# Patient Record
Sex: FEMALE | Race: WHITE | NOT HISPANIC OR LATINO | Employment: OTHER | URBAN - METROPOLITAN AREA
[De-identification: names, ages, dates, MRNs, and addresses within clinical notes are randomized per-mention and may not be internally consistent; named-entity substitution may affect disease eponyms.]

---

## 2017-01-04 ENCOUNTER — PATIENT OUTREACH (OUTPATIENT)
Dept: ADMINISTRATIVE | Facility: HOSPITAL | Age: 72
End: 2017-01-04

## 2017-01-04 NOTE — PROGRESS NOTES
A letter was mailed to the patient on today in regards to the information below.      The patient is due for immunizations(pneumonia, tetanus, & flu) and diabetic eye and foot exam.

## 2017-01-25 ENCOUNTER — LAB VISIT (OUTPATIENT)
Dept: LAB | Facility: HOSPITAL | Age: 72
End: 2017-01-25
Attending: FAMILY MEDICINE
Payer: MEDICARE

## 2017-01-25 ENCOUNTER — OFFICE VISIT (OUTPATIENT)
Dept: FAMILY MEDICINE | Facility: CLINIC | Age: 72
End: 2017-01-25
Payer: MEDICARE

## 2017-01-25 DIAGNOSIS — E78.5 HYPERLIPIDEMIA WITH TARGET LDL LESS THAN 100: ICD-10-CM

## 2017-01-25 DIAGNOSIS — E11.319 CONTROLLED TYPE 2 DIABETES MELLITUS WITH RETINOPATHY WITHOUT MACULAR EDEMA, WITHOUT LONG-TERM CURRENT USE OF INSULIN, UNSPECIFIED LATERALITY, UNSPECIFIED RETINOPATHY SEVERITY: ICD-10-CM

## 2017-01-25 DIAGNOSIS — I10 ESSENTIAL HYPERTENSION: ICD-10-CM

## 2017-01-25 DIAGNOSIS — E11.9 TYPE 2 DIABETES MELLITUS WITHOUT COMPLICATION: ICD-10-CM

## 2017-01-25 DIAGNOSIS — Z00.00 ROUTINE MEDICAL EXAM: Primary | ICD-10-CM

## 2017-01-25 DIAGNOSIS — Z23 NEED FOR VACCINATION FOR PNEUMOCOCCUS: ICD-10-CM

## 2017-01-25 DIAGNOSIS — I70.0 ATHEROSCLEROSIS OF AORTA: ICD-10-CM

## 2017-01-25 LAB
ALBUMIN SERPL BCP-MCNC: 4 G/DL
ALP SERPL-CCNC: 62 U/L
ALT SERPL W/O P-5'-P-CCNC: 15 U/L
ANION GAP SERPL CALC-SCNC: 6 MMOL/L
AST SERPL-CCNC: 17 U/L
BASOPHILS # BLD AUTO: 0.01 K/UL
BASOPHILS NFR BLD: 0.2 %
BILIRUB SERPL-MCNC: 0.7 MG/DL
BUN SERPL-MCNC: 15 MG/DL
CALCIUM SERPL-MCNC: 9.9 MG/DL
CHLORIDE SERPL-SCNC: 102 MMOL/L
CHOLEST/HDLC SERPL: 5.4 {RATIO}
CO2 SERPL-SCNC: 28 MMOL/L
CREAT SERPL-MCNC: 0.8 MG/DL
DIFFERENTIAL METHOD: NORMAL
EOSINOPHIL # BLD AUTO: 0.1 K/UL
EOSINOPHIL NFR BLD: 1.7 %
ERYTHROCYTE [DISTWIDTH] IN BLOOD BY AUTOMATED COUNT: 13.2 %
EST. GFR  (AFRICAN AMERICAN): >60 ML/MIN/1.73 M^2
EST. GFR  (NON AFRICAN AMERICAN): >60 ML/MIN/1.73 M^2
GLUCOSE SERPL-MCNC: 102 MG/DL
HCT VFR BLD AUTO: 38.9 %
HDL/CHOLESTEROL RATIO: 18.4 %
HDLC SERPL-MCNC: 359 MG/DL
HDLC SERPL-MCNC: 66 MG/DL
HGB BLD-MCNC: 13.2 G/DL
LDLC SERPL CALC-MCNC: 255.4 MG/DL
LYMPHOCYTES # BLD AUTO: 1.9 K/UL
LYMPHOCYTES NFR BLD: 29.2 %
MCH RBC QN AUTO: 29.4 PG
MCHC RBC AUTO-ENTMCNC: 33.9 %
MCV RBC AUTO: 87 FL
MONOCYTES # BLD AUTO: 0.4 K/UL
MONOCYTES NFR BLD: 5.9 %
NEUTROPHILS # BLD AUTO: 4.2 K/UL
NEUTROPHILS NFR BLD: 62.8 %
NONHDLC SERPL-MCNC: 293 MG/DL
PLATELET # BLD AUTO: 277 K/UL
PMV BLD AUTO: 9.2 FL
POTASSIUM SERPL-SCNC: 4.3 MMOL/L
PROT SERPL-MCNC: 7.8 G/DL
RBC # BLD AUTO: 4.49 M/UL
SODIUM SERPL-SCNC: 136 MMOL/L
TRIGL SERPL-MCNC: 188 MG/DL
WBC # BLD AUTO: 6.64 K/UL

## 2017-01-25 PROCEDURE — G0009 ADMIN PNEUMOCOCCAL VACCINE: HCPCS | Mod: S$GLB,,, | Performed by: INTERNAL MEDICINE

## 2017-01-25 PROCEDURE — 90670 PCV13 VACCINE IM: CPT | Mod: S$GLB,,, | Performed by: INTERNAL MEDICINE

## 2017-01-25 PROCEDURE — 99499 UNLISTED E&M SERVICE: CPT | Mod: S$GLB,,, | Performed by: INTERNAL MEDICINE

## 2017-01-25 PROCEDURE — 85025 COMPLETE CBC W/AUTO DIFF WBC: CPT

## 2017-01-25 PROCEDURE — 3079F DIAST BP 80-89 MM HG: CPT | Mod: S$GLB,,, | Performed by: INTERNAL MEDICINE

## 2017-01-25 PROCEDURE — 99397 PER PM REEVAL EST PAT 65+ YR: CPT | Mod: S$GLB,,, | Performed by: INTERNAL MEDICINE

## 2017-01-25 PROCEDURE — 36415 COLL VENOUS BLD VENIPUNCTURE: CPT | Mod: PO

## 2017-01-25 PROCEDURE — 3077F SYST BP >= 140 MM HG: CPT | Mod: S$GLB,,, | Performed by: INTERNAL MEDICINE

## 2017-01-25 PROCEDURE — 80061 LIPID PANEL: CPT

## 2017-01-25 PROCEDURE — 99999 PR PBB SHADOW E&M-EST. PATIENT-LVL III: CPT | Mod: PBBFAC,,, | Performed by: INTERNAL MEDICINE

## 2017-01-25 PROCEDURE — 83036 HEMOGLOBIN GLYCOSYLATED A1C: CPT

## 2017-01-25 PROCEDURE — 99499 UNLISTED E&M SERVICE: CPT | Mod: ,,, | Performed by: INTERNAL MEDICINE

## 2017-01-25 PROCEDURE — 80053 COMPREHEN METABOLIC PANEL: CPT

## 2017-01-25 RX ORDER — LOSARTAN POTASSIUM AND HYDROCHLOROTHIAZIDE 12.5; 1 MG/1; MG/1
1 TABLET ORAL DAILY
Qty: 90 TABLET | Refills: 1 | Status: SHIPPED | OUTPATIENT
Start: 2017-01-25 | End: 2020-09-29

## 2017-01-25 NOTE — MR AVS SNAPSHOT
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       These Medications        Disp Refills Start End    losartan-hydrochlorothiazide 100-12.5 mg (HYZAAR) 100-12.5 mg Tab 90 tablet 1 2017     Take 1 tablet by mouth once daily. - Oral    Pharmacy: [facility]/pharmacy #[id number] - [place], [place] - [address] Ph #: [phone]         [facility]     [facility] On Call Nurse Care Line -  Assistance  Registered nurses in the [facility] Call Center provide clinical advisement, health education, appointment booking, and other advisory services.  Call for this free service at [phone].             Medications           Message regarding Medications     Verify the changes and/or additions to your medication regime listed below are the same as discussed with your clinician today.  If any of these changes or additions are incorrect, please notify your healthcare provider.             Verify that the below list of medications is an accurate representation of the medications you  "are currently taking.  If none reported, the list may be blank. If incorrect, please contact your healthcare provider. Carry this list with you in case of emergency.           Current Medications     aspirin (ECOTRIN) 81 MG EC tablet Take 81 mg by mouth once daily.      losartan-hydrochlorothiazide 100-12.5 mg (HYZAAR) 100-12.5 mg Tab Take 1 tablet by mouth once daily.    omega-3 fatty acids (FISH OIL CONCENTRATE) 1,000 mg Cap Take 1 capsule by mouth Daily. 1 Capsule Oral Every day    cyclobenzaprine (FLEXERIL) 5 MG tablet Take 1 tablet (5 mg total) by mouth 3 (three) times daily as needed for Muscle spasms.    naproxen (NAPROSYN) 500 MG tablet Take 1 tablet (500 mg total) by mouth 2 (two) times daily with meals.           Clinical Reference Information           Vital Signs - Last Recorded  Most recent update: 1/25/2017  1:39 PM by Mt Ayala MA    BP Pulse Temp Ht Wt SpO2    (!) 150/80 (BP Location: Left arm, Patient Position: Sitting, BP Method: Manual) 75 97.8 °F (36.6 °C) (Oral) 5' 4" (1.626 m) 82 kg (180 lb 12.4 oz) 97%    BMI                31.03 kg/m2          Blood Pressure          Most Recent Value    BP  (!)  150/80      Allergies as of 1/25/2017     Ace Inhibitors    Sulfa (Sulfonamide Antibiotics)      Immunizations Administered on Date of Encounter - 1/25/2017     Name Date Dose VIS Date Route    Pneumococcal Conjugate - 13 Valent  Incomplete 0.5 mL 11/5/2015 Intramuscular      Orders Placed During Today's Visit      Normal Orders This Visit    Pneumococcal Conjugate Vaccine (13 Valent) (IM)     Future Labs/Procedures Expected by Expires    CBC auto differential  1/25/2017 1/25/2018    Comprehensive metabolic panel  1/25/2017 1/25/2018    Hemoglobin A1c  1/25/2017 1/25/2018    Lipid panel  1/25/2017 1/25/2018      Instructions    You should walk on the treadmill and work up to 30 minutes at a time 5 times a week       "

## 2017-01-26 ENCOUNTER — TELEPHONE (OUTPATIENT)
Dept: FAMILY MEDICINE | Facility: CLINIC | Age: 72
End: 2017-01-26

## 2017-01-26 VITALS
WEIGHT: 180.75 LBS | SYSTOLIC BLOOD PRESSURE: 132 MMHG | BODY MASS INDEX: 30.86 KG/M2 | OXYGEN SATURATION: 97 % | HEART RATE: 75 BPM | HEIGHT: 64 IN | TEMPERATURE: 98 F | DIASTOLIC BLOOD PRESSURE: 84 MMHG

## 2017-01-26 LAB
ESTIMATED AVG GLUCOSE: 131 MG/DL
ESTIMATED AVG GLUCOSE: 131 MG/DL
HBA1C MFR BLD HPLC: 6.2 %
HBA1C MFR BLD HPLC: 6.2 %

## 2017-01-26 RX ORDER — ATORVASTATIN CALCIUM 40 MG/1
40 TABLET, FILM COATED ORAL DAILY
Qty: 90 TABLET | Refills: 1 | Status: SHIPPED | OUTPATIENT
Start: 2017-01-26 | End: 2017-05-19 | Stop reason: SDUPTHER

## 2017-01-26 NOTE — PROGRESS NOTES
Chief Complaint  Chief Complaint   Patient presents with    Hypertension     F/U    Annual Exam       HPI  Bernarda Perera is a 71 y.o. female with medical diagnoses as listed in the medical history and problem list that presents for routine physical.  Pt is known to me with her last appointment 2016.  We adjusted her BP medications at that time.  She is doing quite well.  Reports that her feet swelling has improved.  Her home BP has been running 130s/80s.  No perceived side effects of her medications.  The patient lives in [place], [place].  She is inquiring about options for PCP in that area as it is a 90 minute drive to [facility].      PAST MEDICAL HISTORY:  Past Medical History   Diagnosis Date    Anemia     Arthritis     Back pain     Hyperlipidemia     Hypertension     Mild anemia     Mild diastolic dysfunction     Obesity     Overweight(278.02)     Overweight(278.02)     Trouble in sleeping     Type II or unspecified type diabetes mellitus with ophthalmic manifestations, not stated as uncontrolled     Type II or unspecified type diabetes mellitus without mention of complication, not stated as uncontrolled      diet controlled    Urinary incontinence     Varicose veins        PAST SURGICAL HISTORY:  Past Surgical History   Procedure Laterality Date     section, low transverse       x1    Belt abdominoplasty         SOCIAL HISTORY:  Social History     Social History    Marital status:      Spouse name: N/A    Number of children: 3    Years of education: N/A     Occupational History          Social History Main Topics    Smoking status: Former Smoker    Smokeless tobacco: Never Used    Alcohol use Yes      Comment: once a week    Drug use: Not on file    Sexual activity: Not on file     Other Topics Concern    Not on file     Social History Narrative    Originally from [place]. Moved to the  in .       FAMILY HISTORY:  Family History   Problem  Relation Age of Onset    Cirrhosis Father      ; he was an alcoholic    Hypertension      Diabetes type II      Breast cancer Maternal Aunt        ALLERGIES AND MEDICATIONS: updated and reviewed.  Review of patient's allergies indicates:   Allergen Reactions    Ace inhibitors Other (See Comments)     cough    Sulfa (sulfonamide antibiotics)      Other reaction(s): Unknown     Current Outpatient Prescriptions   Medication Sig Dispense Refill    aspirin (ECOTRIN) 81 MG EC tablet Take 81 mg by mouth once daily.        losartan-hydrochlorothiazide 100-12.5 mg (HYZAAR) 100-12.5 mg Tab Take 1 tablet by mouth once daily. 90 tablet 1    omega-3 fatty acids (FISH OIL CONCENTRATE) 1,000 mg Cap Take 1 capsule by mouth Daily. 1 Capsule Oral Every day      cyclobenzaprine (FLEXERIL) 5 MG tablet Take 1 tablet (5 mg total) by mouth 3 (three) times daily as needed for Muscle spasms. 30 tablet 0    naproxen (NAPROSYN) 500 MG tablet Take 1 tablet (500 mg total) by mouth 2 (two) times daily with meals. 30 tablet 0     No current facility-administered medications for this visit.          ROS  Review of Systems   Constitutional: Negative for chills, fever and unexpected weight change.   HENT: Negative for congestion, ear pain, hearing loss, rhinorrhea, sore throat and trouble swallowing.    Eyes: Negative for discharge, redness and visual disturbance.   Respiratory: Negative for cough, chest tightness, shortness of breath and wheezing.    Cardiovascular: Negative for chest pain, palpitations and leg swelling.   Gastrointestinal: Negative for abdominal pain, constipation, diarrhea, nausea and vomiting.   Endocrine: Negative for polydipsia, polyphagia and polyuria.   Genitourinary: Negative for decreased urine volume, dysuria and hematuria.   Musculoskeletal: Negative for arthralgias, joint swelling and myalgias.   Skin: Negative for color change and rash.   Neurological: Negative for dizziness, weakness,  "light-headedness and headaches.   Psychiatric/Behavioral: Negative for decreased concentration, dysphoric mood, sleep disturbance and suicidal ideas.           Physical Exam  Vitals:    01/25/17 1339 01/26/17 0825   BP: (!) 150/80 132/84   BP Location: Left arm    Patient Position: Sitting    BP Method: Manual    Pulse: 75    Temp: 97.8 °F (36.6 °C)    TempSrc: Oral    SpO2: 97%    Weight: 82 kg (180 lb 12.4 oz)    Height: 5' 4" (1.626 m)     Body mass index is 31.03 kg/(m^2).  Weight: 82 kg (180 lb 12.4 oz)   Height: 5' 4" (162.6 cm)   General appearance: alert, appears stated age, cooperative and no distress  Head: Normocephalic, without obvious abnormality, atraumatic  Eyes: PERRL EOMI conjunctiva clear  Ears: normal TM's and external ear canals both ears  Nose: Nares normal. Septum midline. Mucosa normal. No drainage or sinus tenderness.  Throat: lips, mucosa, and tongue normal; teeth and gums normal  Neck: no adenopathy, no carotid bruit, no JVD, supple, symmetrical, trachea midline and thyroid not enlarged, symmetric, no tenderness/mass/nodules  Back: symmetric, no curvature. ROM normal. No CVA tenderness.  Lungs: clear to auscultation bilaterally  Heart: regular rate and rhythm, S1, S2 normal, no murmur, click, rub or gallop  Abdomen: soft, non-tender; bowel sounds normal; no masses,  no organomegaly  Extremities: extremities normal, atraumatic, no cyanosis or edema  Pulses: 2+ and symmetric  Neurologic: Mental status: Alert, oriented, thought content appropriate  Sensory: normal  Motor:grossly normal  Coordination: normal  Gait: Normal  Symmetric facial movements palate elevated symmetrically tongue midline      Health Maintenance       Date Due Completion Date    TETANUS VACCINE 2/26/1964 ---    Foot Exam 7/2/2016 7/2/2015    Hemoglobin A1c 9/30/2016 3/31/2016    Eye Exam 12/28/2016 12/28/2015 (Done)    Override on 12/28/2015: Done (No DR. Fountain in 1 year)    Override on 5/26/2014: Done (Dr. Weaver " [place] - borderline retinopathy)    Override on 3/8/2012: Done    Mammogram 3/2/2017 3/2/2016    Override on 11/2/2010: Done    Urine Microalbumin 3/31/2017 3/31/2016    DEXA SCAN 5/8/2017 5/8/2014    Override on 1/4/2011: Done    Override on 1/4/2011: Done    Lipid Panel 1/25/2018 1/25/2017    Colonoscopy 7/23/2022 7/23/2012            Assessment & Plan  Routine medical exam - Discussed healthy diet, regular exercise, necessary labs, age appropriate cancer screening, and routine vaccinations.      Essential hypertension - The current medical regimen is effective;  continue present plan and medications.  -     losartan-hydrochlorothiazide 100-12.5 mg (HYZAAR) 100-12.5 mg Tab; Take 1 tablet by mouth once daily.  Dispense: 90 tablet; Refill: 1  -     CBC auto differential; Future; Expected date: 1/25/17  -     Comprehensive metabolic panel; Future; Expected date: 1/25/17  -     Lipid panel; Future; Expected date: 1/25/17    Atherosclerosis of aorta - Stable, asymptomatic chronic condition.  Will continue to maximize risk factor reduction and adjust medication as needed.    Hyperlipidemia with target LDL less than 100 - The current medical regimen is effective;  continue present plan and medications.  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  The patient is asked to make an attempt to improve diet and exercise patterns to aid in medical management of this problem.  Counseled that metformin may be needed  -     CBC auto differential; Future; Expected date: 1/25/17  -     Comprehensive metabolic panel; Future; Expected date: 1/25/17  -     Hemoglobin A1c; Future; Expected date: 1/25/17    Need for vaccination for pneumococcus - vaccinate  -     Pneumococcal  Conjugate Vaccine (13 Valent) (IM)        Health Maintenance reviewed, vaccinate.  Declined flu shot.    Follow-up: Return for with new PCP in [facility].  Gave her information how choosing an [facility] PCP in the [place] area.  Will need flu shot

## 2017-01-26 NOTE — TELEPHONE ENCOUNTER
Called patient advised of physician's recommendation. Patient verbalized an understanding. Patient states Crestor caused her legs to ache.

## 2017-01-26 NOTE — TELEPHONE ENCOUNTER
Her blood count, liver and kidney function are normal.    Her cholesterol is very high, I see she used to take crestor, was there a reason she stopped it? Because her bad cholesterol is over 200 which increases her risk of a heart attack or a stroke.    Marily Sanford MD

## 2017-01-27 NOTE — PROGRESS NOTES
Your lab results are showing an improved sugar level!  However, the cholesterol is VERY HIGH.  I have sent in a once daily medication to your mail in pharmacy.  You should have your cholesterol rechecked with the diabetes labs in 6 months with your new PCP.  Please continue your current medications and doses.  Please feel free to contact me with any questions or concerns.    Sincerely,  Kimo Ferguson  [email or web address]

## 2017-04-06 ENCOUNTER — TELEPHONE (OUTPATIENT)
Dept: FAMILY MEDICINE | Facility: CLINIC | Age: 72
End: 2017-04-06

## 2017-04-06 NOTE — TELEPHONE ENCOUNTER
Pt stopped taking Hyzaar due to generalized body aches. States she googled side effects. Since stopping a few days ago she thinks she is feeling better. Still taking Lipitor. Please advise

## 2017-04-06 NOTE — TELEPHONE ENCOUNTER
----- Message from Krystal Manrique sent at 4/6/2017  1:11 PM CDT -----  Contact: self  Pt is requesting a call from office in regards to meds concern [phone]        Thanks

## 2017-04-11 ENCOUNTER — TELEPHONE (OUTPATIENT)
Dept: FAMILY MEDICINE | Facility: CLINIC | Age: 72
End: 2017-04-11

## 2017-04-11 NOTE — TELEPHONE ENCOUNTER
If she is feeling well this is fine, just make sure she checks her pressure within the next few weeks and follow up if it is high    Marily Sanford MD

## 2017-04-11 NOTE — TELEPHONE ENCOUNTER
----- Message from Idania Ramos sent at 4/11/2017 12:25 PM CDT -----  Contact: self  Pt returned call to the office. [phone]

## 2017-05-19 DIAGNOSIS — E78.5 HYPERLIPIDEMIA WITH TARGET LDL LESS THAN 100: ICD-10-CM

## 2017-05-19 RX ORDER — ATORVASTATIN CALCIUM 40 MG/1
40 TABLET, FILM COATED ORAL DAILY
Qty: 90 TABLET | Refills: 0 | Status: SHIPPED | OUTPATIENT
Start: 2017-05-19 | End: 2017-05-24 | Stop reason: SDUPTHER

## 2017-05-19 NOTE — TELEPHONE ENCOUNTER
----- Message from Janeth Diaz sent at 5/18/2017  4:42 PM CDT -----  Contact: Self/[phone]  Refill:  atorvastatin (LIPITOR) 40 MG tablet     Thank you.

## 2017-05-24 DIAGNOSIS — E78.5 HYPERLIPIDEMIA WITH TARGET LDL LESS THAN 100: ICD-10-CM

## 2017-05-24 RX ORDER — ATORVASTATIN CALCIUM 40 MG/1
40 TABLET, FILM COATED ORAL DAILY
Qty: 30 TABLET | Refills: 0 | Status: SHIPPED | OUTPATIENT
Start: 2017-05-24 | End: 2021-06-25

## 2017-05-24 NOTE — TELEPHONE ENCOUNTER
Patient has an appt with you on 5/30/17. She will be out of atorvastatin before her mail order arrives. Please send 1 mo supply to [facility] in [place].

## 2017-05-24 NOTE — TELEPHONE ENCOUNTER
----- Message from Cassia Bermudez sent at 5/24/2017  8:51 AM CDT -----  Patient needs to have a refill on her atorvastin 40 mg tabs. Patient only has 2 pills left.       [facility]/pharmacy #[id number] - [place], [place] - [address]  [address] [place] [address]  Phone: [phone] Fax: [phone]    Please call patient back at [phone].     Thank you

## 2017-09-18 ENCOUNTER — TELEPHONE (OUTPATIENT)
Dept: FAMILY MEDICINE | Facility: CLINIC | Age: 72
End: 2017-09-18

## 2018-02-19 ENCOUNTER — PES CALL (OUTPATIENT)
Dept: ADMINISTRATIVE | Facility: CLINIC | Age: 73
End: 2018-02-19

## 2018-07-12 ENCOUNTER — PES CALL (OUTPATIENT)
Dept: ADMINISTRATIVE | Facility: CLINIC | Age: 73
End: 2018-07-12

## 2019-02-27 ENCOUNTER — PES CALL (OUTPATIENT)
Dept: ADMINISTRATIVE | Facility: CLINIC | Age: 74
End: 2019-02-27

## 2019-08-06 ENCOUNTER — PES CALL (OUTPATIENT)
Dept: ADMINISTRATIVE | Facility: CLINIC | Age: 74
End: 2019-08-06

## 2019-09-04 ENCOUNTER — PES CALL (OUTPATIENT)
Dept: ADMINISTRATIVE | Facility: CLINIC | Age: 74
End: 2019-09-04

## 2020-02-28 DIAGNOSIS — M25.511 SHOULDER PAIN, RIGHT: Primary | ICD-10-CM

## 2020-09-29 ENCOUNTER — OFFICE VISIT (OUTPATIENT)
Dept: FAMILY MEDICINE | Facility: CLINIC | Age: 75
End: 2020-09-29
Payer: MEDICARE

## 2020-09-29 VITALS
HEART RATE: 80 BPM | WEIGHT: 170 LBS | RESPIRATION RATE: 19 BRPM | HEIGHT: 64 IN | SYSTOLIC BLOOD PRESSURE: 185 MMHG | OXYGEN SATURATION: 99 % | DIASTOLIC BLOOD PRESSURE: 81 MMHG | TEMPERATURE: 97 F | BODY MASS INDEX: 29.02 KG/M2

## 2020-09-29 DIAGNOSIS — E11.319 CONTROLLED TYPE 2 DIABETES MELLITUS WITH RETINOPATHY WITHOUT MACULAR EDEMA, WITHOUT LONG-TERM CURRENT USE OF INSULIN, UNSPECIFIED LATERALITY, UNSPECIFIED RETINOPATHY SEVERITY: Chronic | ICD-10-CM

## 2020-09-29 DIAGNOSIS — Z12.39 SCREENING FOR MALIGNANT NEOPLASM OF BREAST: ICD-10-CM

## 2020-09-29 DIAGNOSIS — Z12.31 ENCOUNTER FOR SCREENING MAMMOGRAM FOR MALIGNANT NEOPLASM OF BREAST: ICD-10-CM

## 2020-09-29 DIAGNOSIS — E78.5 HYPERLIPIDEMIA WITH TARGET LDL LESS THAN 100: Chronic | ICD-10-CM

## 2020-09-29 DIAGNOSIS — Z78.0 ASYMPTOMATIC MENOPAUSAL STATE: ICD-10-CM

## 2020-09-29 DIAGNOSIS — I10 ESSENTIAL HYPERTENSION: Primary | Chronic | ICD-10-CM

## 2020-09-29 PROCEDURE — 1159F PR MEDICATION LIST DOCUMENTED IN MEDICAL RECORD: ICD-10-PCS | Mod: S$GLB,,, | Performed by: NURSE PRACTITIONER

## 2020-09-29 PROCEDURE — 3077F SYST BP >= 140 MM HG: CPT | Mod: CPTII,S$GLB,, | Performed by: NURSE PRACTITIONER

## 2020-09-29 PROCEDURE — 3008F PR BODY MASS INDEX (BMI) DOCUMENTED: ICD-10-PCS | Mod: CPTII,S$GLB,, | Performed by: NURSE PRACTITIONER

## 2020-09-29 PROCEDURE — 1126F AMNT PAIN NOTED NONE PRSNT: CPT | Mod: S$GLB,,, | Performed by: NURSE PRACTITIONER

## 2020-09-29 PROCEDURE — 1101F PR PT FALLS ASSESS DOC 0-1 FALLS W/OUT INJ PAST YR: ICD-10-PCS | Mod: CPTII,S$GLB,, | Performed by: NURSE PRACTITIONER

## 2020-09-29 PROCEDURE — 1159F MED LIST DOCD IN RCRD: CPT | Mod: S$GLB,,, | Performed by: NURSE PRACTITIONER

## 2020-09-29 PROCEDURE — 99204 OFFICE O/P NEW MOD 45 MIN: CPT | Mod: S$GLB,,, | Performed by: NURSE PRACTITIONER

## 2020-09-29 PROCEDURE — 1126F PR PAIN SEVERITY QUANTIFIED, NO PAIN PRESENT: ICD-10-PCS | Mod: S$GLB,,, | Performed by: NURSE PRACTITIONER

## 2020-09-29 PROCEDURE — 99999 PR PBB SHADOW E&M-EST. PATIENT-LVL V: CPT | Mod: PBBFAC,,, | Performed by: NURSE PRACTITIONER

## 2020-09-29 PROCEDURE — 99204 PR OFFICE/OUTPT VISIT, NEW, LEVL IV, 45-59 MIN: ICD-10-PCS | Mod: S$GLB,,, | Performed by: NURSE PRACTITIONER

## 2020-09-29 PROCEDURE — 3079F DIAST BP 80-89 MM HG: CPT | Mod: CPTII,S$GLB,, | Performed by: NURSE PRACTITIONER

## 2020-09-29 PROCEDURE — 99999 PR PBB SHADOW E&M-EST. PATIENT-LVL V: ICD-10-PCS | Mod: PBBFAC,,, | Performed by: NURSE PRACTITIONER

## 2020-09-29 PROCEDURE — 82043 UR ALBUMIN QUANTITATIVE: CPT

## 2020-09-29 PROCEDURE — 3008F BODY MASS INDEX DOCD: CPT | Mod: CPTII,S$GLB,, | Performed by: NURSE PRACTITIONER

## 2020-09-29 PROCEDURE — 3077F PR MOST RECENT SYSTOLIC BLOOD PRESSURE >= 140 MM HG: ICD-10-PCS | Mod: CPTII,S$GLB,, | Performed by: NURSE PRACTITIONER

## 2020-09-29 PROCEDURE — 1101F PT FALLS ASSESS-DOCD LE1/YR: CPT | Mod: CPTII,S$GLB,, | Performed by: NURSE PRACTITIONER

## 2020-09-29 PROCEDURE — 3079F PR MOST RECENT DIASTOLIC BLOOD PRESSURE 80-89 MM HG: ICD-10-PCS | Mod: CPTII,S$GLB,, | Performed by: NURSE PRACTITIONER

## 2020-09-29 RX ORDER — METOPROLOL SUCCINATE 25 MG/1
TABLET, EXTENDED RELEASE ORAL
COMMUNITY
Start: 2020-07-28 | End: 2021-10-29 | Stop reason: SDUPTHER

## 2020-09-29 RX ORDER — A/SINGAPORE/GP1908/2015 IVR-180 (AN A/MICHIGAN/45/2015 (H1N1)PDM09-LIKE VIRUS, A/HONG KONG/4801/2014, NYMC X-263B (H3N2) (AN A/HONG KONG/4801/2014-LIKE VIRUS), AND B/BRISBANE/60/2008, WILD TYPE (A B/BRISBANE/60/2008-LIKE VIRUS) 15; 15; 15 UG/.5ML; UG/.5ML; UG/.5ML
INJECTION, SUSPENSION INTRAMUSCULAR
COMMUNITY
Start: 2020-09-25 | End: 2021-06-25

## 2020-09-29 RX ORDER — AMLODIPINE BESYLATE 10 MG/1
10 TABLET ORAL DAILY
Qty: 30 TABLET | Refills: 11 | Status: SHIPPED | OUTPATIENT
Start: 2020-09-29 | End: 2021-03-23 | Stop reason: SDUPTHER

## 2020-09-29 RX ORDER — AMLODIPINE BESYLATE 2.5 MG/1
TABLET ORAL
COMMUNITY
Start: 2020-07-28 | End: 2020-09-29

## 2020-09-29 RX ORDER — ROSUVASTATIN CALCIUM 5 MG/1
TABLET, COATED ORAL
COMMUNITY
Start: 2020-08-03 | End: 2021-03-22 | Stop reason: SDUPTHER

## 2020-09-29 NOTE — PROGRESS NOTES
Subjective:       Patient ID: Bernarda Perera is a 75 y.o. female.    Chief Complaint: Establish Care and Annual Exam    Mrs. Bernarda Perera is a 75 year old female who presents to the clinic today to establish care. Medical history and medications reviewed. [place] here [place] Has dogs that she loves and takes care of.     In regards to the patient's hypertension, patient denies chest pain/sob, and has been compliant with the medication regimen. hypertension needs further observation and needs improvement. Goal of <130/80. Meds and labs as per orders. Taking 2.5 mg of amlodipine currently. In regards to the patient's dyslipidemia, patient reports has been compliant with the medication regimen  without side effects. Lipid not UTD    HM:  Labs due, mammogram due, DEXA due              Review of Systems   Constitutional: Negative for activity change, appetite change, chills, fatigue, fever and unexpected weight change.   HENT: Negative for congestion, ear pain, postnasal drip, sore throat and tinnitus.    Eyes: Negative for pain and visual disturbance.   Respiratory: Negative for apnea, cough, chest tightness, shortness of breath and wheezing.    Cardiovascular: Negative for chest pain, palpitations and leg swelling.   Gastrointestinal: Negative for abdominal distention, abdominal pain, blood in stool, constipation, diarrhea, nausea and vomiting.   Endocrine: Negative for polydipsia and polyuria.   Genitourinary: Negative for difficulty urinating, flank pain, frequency and urgency.   Musculoskeletal: Negative for arthralgias, back pain, joint swelling and myalgias.   Skin: Negative for color change, pallor and rash.   Allergic/Immunologic: Negative for environmental allergies and food allergies.   Neurological: Negative for dizziness, tremors, syncope, weakness, light-headedness and headaches.   Hematological: Does not bruise/bleed easily.   Psychiatric/Behavioral: Negative for  "agitation, decreased concentration, self-injury, sleep disturbance and suicidal ideas. The patient is not nervous/anxious.          Reviewed family, medical, surgical, and social history.    Objective:      BP (!) 185/81 (BP Location: Right arm, Patient Position: Standing, BP Method: Medium (Automatic))   Pulse 80   Temp 97.1 °F (36.2 °C) (Tympanic)   Resp 19   Ht 5' 4" (1.626 m)   Wt 77.1 kg (170 lb)   SpO2 99%   BMI 29.18 kg/m²   Physical Exam  Constitutional:       General: She is not in acute distress.     Appearance: Normal appearance. She is well-developed. She is not diaphoretic.   HENT:      Head: Normocephalic and atraumatic.      Right Ear: Hearing, tympanic membrane, ear canal and external ear normal.      Left Ear: Hearing, tympanic membrane, ear canal and external ear normal.      Nose: Nose normal.      Mouth/Throat:      Pharynx: Uvula midline. No uvula swelling.   Eyes:      General: Lids are normal.      Conjunctiva/sclera: Conjunctivae normal.      Pupils: Pupils are equal, round, and reactive to light.   Neck:      Musculoskeletal: Full passive range of motion without pain. No neck rigidity.      Thyroid: No thyroid mass.      Trachea: Trachea normal. No tracheal tenderness.   Cardiovascular:      Rate and Rhythm: Normal rate and regular rhythm.      Pulses: Normal pulses.      Heart sounds: Normal heart sounds, S1 normal and S2 normal.   Pulmonary:      Effort: Pulmonary effort is normal. No respiratory distress.      Breath sounds: Normal breath sounds. No decreased breath sounds or wheezing.   Abdominal:      General: Bowel sounds are normal. There is no distension or abdominal bruit.      Palpations: Abdomen is soft.      Tenderness: There is no guarding.   Musculoskeletal:         General: No tenderness or deformity.   Lymphadenopathy:      Cervical: No cervical adenopathy.   Skin:     General: Skin is warm and dry.      Capillary Refill: Capillary refill takes less than 2 seconds.     "  Findings: No abrasion, laceration, lesion or rash.      Nails: There is no clubbing.     Neurological:      Mental Status: She is alert and oriented to person, place, and time.      Motor: No abnormal muscle tone.   Psychiatric:         Speech: Speech normal.         Behavior: Behavior normal. Behavior is cooperative.         Thought Content: Thought content normal.         Judgment: Judgment normal.         Assessment:       1. Essential hypertension    2. 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 Hyperlipidemia with target LDL less than 100    4. Screening for malignant neoplasm of breast    5. Encounter for screening mammogram for malignant neoplasm of breast     6. Asymptomatic menopausal state        Plan:       Essential hypertension  -     amLODIPine (NORVASC) 10 MG tablet; Take 1 tablet (10 mg total) by mouth once daily.  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, without long-term current use of insulin, unspecified laterality, unspecified retinopathy severity  -     Hemoglobin A1C; Future; Expected date: 09/29/2020  -     MICROALBUMIN / CREATININE RATIO URINE  -     Diabetic Eye Screening Photo; Future  -     Ambulatory referral/consult to Home Health; Future; Expected date: 10/06/2020    Hyperlipidemia with target LDL less than 100  -     Lipid Panel; Future; Expected date: 09/29/2020  -     Ambulatory referral/consult to Home Health; Future; Expected date: 10/06/2020    Screening for malignant neoplasm of breast  -     Mammo Digital  Screening Bilat; Future; Expected date: 09/29/2020    Encounter for screening mammogram for malignant neoplasm of breast   -     Mammo Digital Screening Bilat; Future; Expected date: 09/29/2020    Asymptomatic menopausal state  -     DXA Bone Density Spine And Hip; Future; Expected date: 09/29/2020          PLAN:  - Discussed with patient the plan of care  - Increase amlodpine to 10 mg daily  - Monitor BP; reviewed readings  - Will have HH come for BP checks and visits  - Medications reviewed. Medication side effects discussed. Patient has no questions or concerns at this time. Informed patient to notify me regarding any concerns.   - Labs to be completed  - Informed patient to please notify me with any questions or concerns at anytime  - Follow up ordered for 2 weeks for BP check and 4 weeks for visit      Risks, benefits, and side effects were discussed with the patient. All questions were answered to the fullest satisfaction of the patient, and pt verbalized understanding and agreement to treatment plan. Pt was to call with any new or worsening symptoms, or present to the ER.

## 2020-09-30 LAB
ALBUMIN/CREAT UR: 13.2 UG/MG (ref 0–30)
CREAT UR-MCNC: 258 MG/DL (ref 15–325)
MICROALBUMIN UR DL<=1MG/L-MCNC: 34 UG/ML

## 2020-10-01 PROCEDURE — 99499 UNLISTED E&M SERVICE: CPT | Mod: ,,, | Performed by: FAMILY MEDICINE

## 2020-10-01 PROCEDURE — G0180 PR HOME HEALTH MD CERTIFICATION: ICD-10-PCS | Mod: ,,, | Performed by: FAMILY MEDICINE

## 2020-10-01 PROCEDURE — G0180 MD CERTIFICATION HHA PATIENT: HCPCS | Mod: ,,, | Performed by: FAMILY MEDICINE

## 2020-10-01 PROCEDURE — 99499 NO LOS: ICD-10-PCS | Mod: ,,, | Performed by: FAMILY MEDICINE

## 2020-10-08 ENCOUNTER — EXTERNAL HOME HEALTH (OUTPATIENT)
Dept: HOME HEALTH SERVICES | Facility: HOSPITAL | Age: 75
End: 2020-10-08
Payer: MEDICARE

## 2020-10-13 ENCOUNTER — DOCUMENT SCAN (OUTPATIENT)
Dept: HOME HEALTH SERVICES | Facility: HOSPITAL | Age: 75
End: 2020-10-13
Payer: MEDICARE

## 2020-10-15 ENCOUNTER — DOCUMENT SCAN (OUTPATIENT)
Dept: HOME HEALTH SERVICES | Facility: HOSPITAL | Age: 75
End: 2020-10-15
Payer: MEDICARE

## 2020-11-11 ENCOUNTER — HOSPITAL ENCOUNTER (OUTPATIENT)
Dept: RADIOLOGY | Facility: HOSPITAL | Age: 75
Discharge: HOME | End: 2020-11-11
Attending: NURSE PRACTITIONER
Payer: MEDICARE

## 2020-11-11 VITALS — BODY MASS INDEX: 28.71 KG/M2 | HEIGHT: 64 IN | WEIGHT: 168.19 LBS

## 2020-11-11 DIAGNOSIS — Z12.31 ENCOUNTER FOR SCREENING MAMMOGRAM FOR MALIGNANT NEOPLASM OF BREAST: ICD-10-CM

## 2020-11-11 DIAGNOSIS — Z12.39 SCREENING FOR MALIGNANT NEOPLASM OF BREAST: ICD-10-CM

## 2020-11-11 DIAGNOSIS — Z78.0 ASYMPTOMATIC MENOPAUSAL STATE: ICD-10-CM

## 2020-11-11 PROCEDURE — 77080 DXA BONE DENSITY AXIAL: CPT | Mod: TC

## 2020-11-11 PROCEDURE — 77063 BREAST TOMOSYNTHESIS BI: CPT | Mod: 26,,, | Performed by: RADIOLOGY

## 2020-11-11 PROCEDURE — 77080 DXA BONE DENSITY AXIAL: CPT | Mod: 26,,, | Performed by: RADIOLOGY

## 2020-11-11 PROCEDURE — 77080 DEXA BONE DENSITY SPINE HIP: ICD-10-PCS | Mod: 26,,, | Performed by: RADIOLOGY

## 2020-11-11 PROCEDURE — 77067 SCR MAMMO BI INCL CAD: CPT | Mod: TC

## 2020-11-11 PROCEDURE — 77063 MAMMO DIGITAL SCREENING BILAT WITH TOMO: ICD-10-PCS | Mod: 26,,, | Performed by: RADIOLOGY

## 2020-11-11 PROCEDURE — 77067 MAMMO DIGITAL SCREENING BILAT WITH TOMO: ICD-10-PCS | Mod: 26,,, | Performed by: RADIOLOGY

## 2020-11-11 PROCEDURE — 77067 SCR MAMMO BI INCL CAD: CPT | Mod: 26,,, | Performed by: RADIOLOGY

## 2020-11-17 ENCOUNTER — HOSPITAL ENCOUNTER (OUTPATIENT)
Dept: RADIOLOGY | Facility: HOSPITAL | Age: 75
Discharge: HOME | End: 2020-11-17
Attending: FAMILY MEDICINE
Payer: MEDICARE

## 2020-11-17 ENCOUNTER — OFFICE VISIT (OUTPATIENT)
Dept: FAMILY MEDICINE | Facility: CLINIC | Age: 75
End: 2020-11-17
Payer: MEDICARE

## 2020-11-17 VITALS
HEART RATE: 84 BPM | WEIGHT: 173.38 LBS | SYSTOLIC BLOOD PRESSURE: 130 MMHG | OXYGEN SATURATION: 97 % | HEIGHT: 64 IN | RESPIRATION RATE: 18 BRPM | BODY MASS INDEX: 29.6 KG/M2 | DIASTOLIC BLOOD PRESSURE: 84 MMHG | TEMPERATURE: 98 F

## 2020-11-17 DIAGNOSIS — M25.512 CHRONIC LEFT SHOULDER PAIN: ICD-10-CM

## 2020-11-17 DIAGNOSIS — G89.29 CHRONIC LEFT SHOULDER PAIN: ICD-10-CM

## 2020-11-17 DIAGNOSIS — G89.29 CHRONIC LEFT SHOULDER PAIN: Primary | ICD-10-CM

## 2020-11-17 DIAGNOSIS — M25.512 CHRONIC LEFT SHOULDER PAIN: Primary | ICD-10-CM

## 2020-11-17 PROCEDURE — 99214 PR OFFICE/OUTPT VISIT, EST, LEVL IV, 30-39 MIN: ICD-10-PCS | Mod: 25,S$GLB,, | Performed by: FAMILY MEDICINE

## 2020-11-17 PROCEDURE — 3288F FALL RISK ASSESSMENT DOCD: CPT | Mod: CPTII,S$GLB,, | Performed by: FAMILY MEDICINE

## 2020-11-17 PROCEDURE — 1159F MED LIST DOCD IN RCRD: CPT | Mod: S$GLB,,, | Performed by: FAMILY MEDICINE

## 2020-11-17 PROCEDURE — 3079F DIAST BP 80-89 MM HG: CPT | Mod: CPTII,S$GLB,, | Performed by: FAMILY MEDICINE

## 2020-11-17 PROCEDURE — 73030 XR SHOULDER COMPLETE 2 OR MORE VIEWS LEFT: ICD-10-PCS | Mod: 26,LT,, | Performed by: RADIOLOGY

## 2020-11-17 PROCEDURE — 3288F PR FALLS RISK ASSESSMENT DOCUMENTED: ICD-10-PCS | Mod: CPTII,S$GLB,, | Performed by: FAMILY MEDICINE

## 2020-11-17 PROCEDURE — 73030 X-RAY EXAM OF SHOULDER: CPT | Mod: 26,LT,, | Performed by: RADIOLOGY

## 2020-11-17 PROCEDURE — 3008F BODY MASS INDEX DOCD: CPT | Mod: CPTII,S$GLB,, | Performed by: FAMILY MEDICINE

## 2020-11-17 PROCEDURE — 1125F AMNT PAIN NOTED PAIN PRSNT: CPT | Mod: S$GLB,,, | Performed by: FAMILY MEDICINE

## 2020-11-17 PROCEDURE — 99214 OFFICE O/P EST MOD 30 MIN: CPT | Mod: 25,S$GLB,, | Performed by: FAMILY MEDICINE

## 2020-11-17 PROCEDURE — 1159F PR MEDICATION LIST DOCUMENTED IN MEDICAL RECORD: ICD-10-PCS | Mod: S$GLB,,, | Performed by: FAMILY MEDICINE

## 2020-11-17 PROCEDURE — 20610 DRAIN/INJ JOINT/BURSA W/O US: CPT | Mod: LT,S$GLB,, | Performed by: FAMILY MEDICINE

## 2020-11-17 PROCEDURE — 1125F PR PAIN SEVERITY QUANTIFIED, PAIN PRESENT: ICD-10-PCS | Mod: S$GLB,,, | Performed by: FAMILY MEDICINE

## 2020-11-17 PROCEDURE — 3079F PR MOST RECENT DIASTOLIC BLOOD PRESSURE 80-89 MM HG: ICD-10-PCS | Mod: CPTII,S$GLB,, | Performed by: FAMILY MEDICINE

## 2020-11-17 PROCEDURE — 3008F PR BODY MASS INDEX (BMI) DOCUMENTED: ICD-10-PCS | Mod: CPTII,S$GLB,, | Performed by: FAMILY MEDICINE

## 2020-11-17 PROCEDURE — 3075F SYST BP GE 130 - 139MM HG: CPT | Mod: CPTII,S$GLB,, | Performed by: FAMILY MEDICINE

## 2020-11-17 PROCEDURE — 1101F PR PT FALLS ASSESS DOC 0-1 FALLS W/OUT INJ PAST YR: ICD-10-PCS | Mod: CPTII,S$GLB,, | Performed by: FAMILY MEDICINE

## 2020-11-17 PROCEDURE — 1101F PT FALLS ASSESS-DOCD LE1/YR: CPT | Mod: CPTII,S$GLB,, | Performed by: FAMILY MEDICINE

## 2020-11-17 PROCEDURE — 73030 X-RAY EXAM OF SHOULDER: CPT | Mod: TC,FY,LT

## 2020-11-17 PROCEDURE — 3075F PR MOST RECENT SYSTOLIC BLOOD PRESS GE 130-139MM HG: ICD-10-PCS | Mod: CPTII,S$GLB,, | Performed by: FAMILY MEDICINE

## 2020-11-17 PROCEDURE — 20610 PR DRAIN/INJECT LARGE JOINT/BURSA: ICD-10-PCS | Mod: LT,S$GLB,, | Performed by: FAMILY MEDICINE

## 2020-11-17 RX ORDER — TRIAMCINOLONE ACETONIDE 40 MG/ML
40 INJECTION, SUSPENSION INTRA-ARTICULAR; INTRAMUSCULAR
Status: COMPLETED | OUTPATIENT
Start: 2020-11-17 | End: 2020-11-17

## 2020-11-17 RX ORDER — LIDOCAINE HYDROCHLORIDE 10 MG/ML
1 INJECTION, SOLUTION EPIDURAL; INFILTRATION; INTRACAUDAL; PERINEURAL
Status: DISCONTINUED | OUTPATIENT
Start: 2020-11-17 | End: 2020-11-30

## 2020-11-17 RX ADMIN — TRIAMCINOLONE ACETONIDE 40 MG: 40 INJECTION, SUSPENSION INTRA-ARTICULAR; INTRAMUSCULAR at 03:11

## 2020-11-17 RX ADMIN — LIDOCAINE HYDROCHLORIDE 1 ML: 10 INJECTION INFILTRATION; PERINEURAL at 03:11

## 2020-11-17 NOTE — PROGRESS NOTES
[facility] - Clinic Note    Subjective      Ms. Perera is a 75 y.o. female who presents to clinic with complaints of shoulder pain.     Complains of left shoulder pain.   Present for the past couple months and getting worse.   Locates the pain on the front of the shoulder  No radiation of pain  Denies trauma/injury.  Takes over the counter meds with no relief.      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   [facility] Bernarda has a past surgical history that includes  section, low transverse; Belt abdominoplasty; and Breast cyst excision.    Bernarda's family history includes Breast cancer in her maternal aunt; Cirrhosis in her father; Diabetes type II in her unknown relative; Hypertension in her unknown relative.    Bernarda reports that she has quit smoking. She has never used smokeless tobacco. She reports current alcohol use. No history on file for drug.   MARIANNA Menchaca is allergic to ace inhibitors and sulfa (sulfonamide antibiotics).   CRISTI Mencahca has a current medication list which includes the following prescription(s): amlodipine, aspirin, atorvastatin, cyclobenzaprine, fluad quad 2020-21(65y up)(pf), metoprolol succinate, omega-3 fatty acids, and rosuvastatin.     Review of Systems   Constitutional: Negative for activity change, appetite change, chills, fatigue and fever.   Eyes: Negative for visual disturbance.   Respiratory: Negative for cough and shortness of breath.    Cardiovascular: Negative for chest pain, palpitations and leg swelling.   Gastrointestinal: Negative for abdominal  "pain, nausea and vomiting.   Musculoskeletal: Positive for arthralgias.        Left shoulder   Skin: Negative for wound.   Psychiatric/Behavioral: Negative for confusion.     Objective     /84 (BP Location: Left arm, Patient Position: Sitting, BP Method: Medium (Manual))   Pulse 84   Temp 97.5 °F (36.4 °C) (Temporal)   Resp 18   Ht 5' 4" (1.626 m)   Wt 78.6 kg (173 lb 6 oz)   SpO2 97%   BMI 29.76 kg/m²     Physical Exam   Constitutional: She appears well-developed and well-nourished.  Non-toxic appearance. She does not appear ill. No distress.   HENT:   Head: Normocephalic and atraumatic.   Eyes: Right eye exhibits no discharge. Left eye exhibits no discharge.   Neck: Neck supple.   Cardiovascular: Normal rate, regular rhythm, normal heart sounds and normal pulses. Exam reveals no gallop and no friction rub.   No murmur heard.  Pulmonary/Chest: Effort normal and breath sounds normal. No respiratory distress. She has no wheezes. She has no rhonchi. She has no rales.   Abdominal: Normal appearance.   Musculoskeletal:      Comments: Left shoulder: No effusion, erythema, ecchymosis, warmth, or deformities noted. ROM intact. Pain with abduction. +TTP in the bicipital groove. +speeds. Apleys intact. Negative empty cans test. Strength 5/5.   Lymphadenopathy:     She has no cervical adenopathy.   Neurological: She is alert.   Skin: Skin is warm and dry. Capillary refill takes less than 2 seconds. She is not diaphoretic.   Psychiatric: Her behavior is normal. Mood, judgment and thought content normal.   Vitals reviewed.       Procedure Note:    Diagnosis: chronic left shoulder pain  Procedure: left subacromial injection    Written and verbal consent was obtain from the patient. The area was prepped with Beta Iodine in the usual sterile manner. 25'' gauge needle was inserted into the posterolateral aspect of the left shoulder. Solution of 1cc 1% Lidocaine and 1cc of Kenalog 40 was injected. Minimal bleeding and " pressure was applied. Area was cleaned and band-aid placed.  Patient tolerated the procedure well. There were no complications during this procedure.   Standard post-procedure care explained to the patient and precautions were given.   Patient instructed to ice the area today and rest.     Assessment/Plan     Bernarda was seen today for shoulder pain.    Diagnoses and all orders for this visit:    Chronic left shoulder pain  -     X-Ray Shoulder 2 or More Views Left; Future  -     triamcinolone acetonide injection 40 mg  -     lidocaine (PF) 10 mg/ml (1%) injection 10 mg  -     Ambulatory referral/consult to Physical/Occupational Therapy; Future  - Tolerated procedure well. No heavy lifting.   - Possibly due to bicep tendonitis.      Follow up in about 1 month (around 12/17/2020).    Future Appointments   Date Time Provider Department Center   12/15/2020  2:40 PM Domenica Hare MD [facility]       Domenica Hare MD  Family Medicine  [facility]

## 2020-11-30 RX ORDER — LIDOCAINE HYDROCHLORIDE 10 MG/ML
1 INJECTION INFILTRATION; PERINEURAL
Status: COMPLETED | OUTPATIENT
Start: 2020-11-17 | End: 2020-11-17

## 2020-11-30 RX ORDER — LIDOCAINE HYDROCHLORIDE 10 MG/ML
1 INJECTION, SOLUTION EPIDURAL; INFILTRATION; INTRACAUDAL; PERINEURAL
Status: DISCONTINUED | OUTPATIENT
Start: 2020-11-30 | End: 2020-11-30

## 2020-12-03 ENCOUNTER — DOCUMENT SCAN (OUTPATIENT)
Dept: HOME HEALTH SERVICES | Facility: HOSPITAL | Age: 75
End: 2020-12-03
Payer: MEDICARE

## 2020-12-28 ENCOUNTER — TELEPHONE (OUTPATIENT)
Dept: FAMILY MEDICINE | Facility: CLINIC | Age: 75
End: 2020-12-28

## 2020-12-28 DIAGNOSIS — Z03.818 ENCOUNTER FOR OBSERVATION FOR SUSPECTED EXPOSURE TO OTHER BIOLOGICAL AGENTS RULED OUT: ICD-10-CM

## 2020-12-28 NOTE — TELEPHONE ENCOUNTER
----- Message from Zaheer Mai sent at 12/28/2020  4:08 PM CST -----  Type: Needs Medical Advice    Who Called:  Patient  Best Call Back Number: [phone]  Additional Information: Patient would like to discuss receiving orders for a covid test. Please call to advise. Thanks!

## 2020-12-29 ENCOUNTER — LAB VISIT (OUTPATIENT)
Dept: FAMILY MEDICINE | Facility: CLINIC | Age: 75
End: 2020-12-29
Payer: MEDICARE

## 2020-12-29 DIAGNOSIS — Z03.818 ENCOUNTER FOR OBSERVATION FOR SUSPECTED EXPOSURE TO OTHER BIOLOGICAL AGENTS RULED OUT: ICD-10-CM

## 2020-12-29 PROCEDURE — U0003 INFECTIOUS AGENT DETECTION BY NUCLEIC ACID (DNA OR RNA); SEVERE ACUTE RESPIRATORY SYNDROME CORONAVIRUS 2 (SARS-COV-2) (CORONAVIRUS DISEASE [COVID-19]), AMPLIFIED PROBE TECHNIQUE, MAKING USE OF HIGH THROUGHPUT TECHNOLOGIES AS DESCRIBED BY CMS-2020-01-R: HCPCS

## 2020-12-29 NOTE — TELEPHONE ENCOUNTER
----- Message from Beatriz Nieves sent at 12/29/2020  7:42 AM CST -----  Contact: pt  Type: Needs Medical Advice  Who Called:  #patient   Best Call Back Number: [phone]  Additional Information: Patient is asking to be tested for COVID her daughter in law just tested positive. She was around her for [employer].

## 2020-12-30 LAB — SARS-COV-2 RNA RESP QL NAA+PROBE: NOT DETECTED

## 2020-12-31 ENCOUNTER — TELEPHONE (OUTPATIENT)
Dept: FAMILY MEDICINE | Facility: CLINIC | Age: 75
End: 2020-12-31

## 2020-12-31 NOTE — TELEPHONE ENCOUNTER
Spoke with patient about COVID19 results. Patients results are negative, verbalized understanding.   MAHIN MEREDITH LPN

## 2020-12-31 NOTE — TELEPHONE ENCOUNTER
----- Message from Deisi Glover sent at 12/31/2020  9:12 AM CST -----  Regarding: results  Contact: pt  Type:  Test Results    Who Called:  pt  Name of Test (Lab/Mammo/Etc):  covid test  Date of Test:  not sure  Ordering Provider:  trudy  Where the test was performed[facility]  Best Call Back Number:  [phone] (home)     Additional Information:  please call with results

## 2021-01-07 ENCOUNTER — TELEPHONE (OUTPATIENT)
Dept: ORTHOPEDICS | Facility: CLINIC | Age: 76
End: 2021-01-07

## 2021-01-07 ENCOUNTER — OFFICE VISIT (OUTPATIENT)
Dept: FAMILY MEDICINE | Facility: CLINIC | Age: 76
End: 2021-01-07
Payer: MEDICARE

## 2021-01-07 VITALS
BODY MASS INDEX: 29.02 KG/M2 | WEIGHT: 170 LBS | RESPIRATION RATE: 15 BRPM | SYSTOLIC BLOOD PRESSURE: 138 MMHG | TEMPERATURE: 98 F | HEART RATE: 80 BPM | OXYGEN SATURATION: 98 % | DIASTOLIC BLOOD PRESSURE: 79 MMHG | HEIGHT: 64 IN

## 2021-01-07 DIAGNOSIS — I10 ESSENTIAL HYPERTENSION: ICD-10-CM

## 2021-01-07 DIAGNOSIS — M25.512 CHRONIC LEFT SHOULDER PAIN: Primary | ICD-10-CM

## 2021-01-07 DIAGNOSIS — G43.711 CHRONIC MIGRAINE WITHOUT AURA, WITH INTRACTABLE MIGRAINE, SO STATED, WITH STATUS MIGRAINOSUS: ICD-10-CM

## 2021-01-07 DIAGNOSIS — G89.29 CHRONIC LEFT SHOULDER PAIN: Primary | ICD-10-CM

## 2021-01-07 PROCEDURE — 99214 OFFICE O/P EST MOD 30 MIN: CPT | Mod: S$GLB,,, | Performed by: NURSE PRACTITIONER

## 2021-01-07 PROCEDURE — 3288F PR FALLS RISK ASSESSMENT DOCUMENTED: ICD-10-PCS | Mod: CPTII,S$GLB,, | Performed by: NURSE PRACTITIONER

## 2021-01-07 PROCEDURE — 1125F AMNT PAIN NOTED PAIN PRSNT: CPT | Mod: S$GLB,,, | Performed by: NURSE PRACTITIONER

## 2021-01-07 PROCEDURE — 1159F PR MEDICATION LIST DOCUMENTED IN MEDICAL RECORD: ICD-10-PCS | Mod: S$GLB,,, | Performed by: NURSE PRACTITIONER

## 2021-01-07 PROCEDURE — 3288F FALL RISK ASSESSMENT DOCD: CPT | Mod: CPTII,S$GLB,, | Performed by: NURSE PRACTITIONER

## 2021-01-07 PROCEDURE — 1101F PT FALLS ASSESS-DOCD LE1/YR: CPT | Mod: CPTII,S$GLB,, | Performed by: NURSE PRACTITIONER

## 2021-01-07 PROCEDURE — 1101F PR PT FALLS ASSESS DOC 0-1 FALLS W/OUT INJ PAST YR: ICD-10-PCS | Mod: CPTII,S$GLB,, | Performed by: NURSE PRACTITIONER

## 2021-01-07 PROCEDURE — 3078F PR MOST RECENT DIASTOLIC BLOOD PRESSURE < 80 MM HG: ICD-10-PCS | Mod: CPTII,S$GLB,, | Performed by: NURSE PRACTITIONER

## 2021-01-07 PROCEDURE — 3008F PR BODY MASS INDEX (BMI) DOCUMENTED: ICD-10-PCS | Mod: CPTII,S$GLB,, | Performed by: NURSE PRACTITIONER

## 2021-01-07 PROCEDURE — 3008F BODY MASS INDEX DOCD: CPT | Mod: CPTII,S$GLB,, | Performed by: NURSE PRACTITIONER

## 2021-01-07 PROCEDURE — 1159F MED LIST DOCD IN RCRD: CPT | Mod: S$GLB,,, | Performed by: NURSE PRACTITIONER

## 2021-01-07 PROCEDURE — 99999 PR PBB SHADOW E&M-EST. PATIENT-LVL IV: CPT | Mod: PBBFAC,,, | Performed by: NURSE PRACTITIONER

## 2021-01-07 PROCEDURE — 3075F SYST BP GE 130 - 139MM HG: CPT | Mod: CPTII,S$GLB,, | Performed by: NURSE PRACTITIONER

## 2021-01-07 PROCEDURE — 1125F PR PAIN SEVERITY QUANTIFIED, PAIN PRESENT: ICD-10-PCS | Mod: S$GLB,,, | Performed by: NURSE PRACTITIONER

## 2021-01-07 PROCEDURE — 99214 PR OFFICE/OUTPT VISIT, EST, LEVL IV, 30-39 MIN: ICD-10-PCS | Mod: S$GLB,,, | Performed by: NURSE PRACTITIONER

## 2021-01-07 PROCEDURE — 99999 PR PBB SHADOW E&M-EST. PATIENT-LVL IV: ICD-10-PCS | Mod: PBBFAC,,, | Performed by: NURSE PRACTITIONER

## 2021-01-07 PROCEDURE — 3078F DIAST BP <80 MM HG: CPT | Mod: CPTII,S$GLB,, | Performed by: NURSE PRACTITIONER

## 2021-01-07 PROCEDURE — 3075F PR MOST RECENT SYSTOLIC BLOOD PRESS GE 130-139MM HG: ICD-10-PCS | Mod: CPTII,S$GLB,, | Performed by: NURSE PRACTITIONER

## 2021-01-07 RX ORDER — TOPIRAMATE 25 MG/1
25 TABLET ORAL 2 TIMES DAILY
Qty: 60 TABLET | Refills: 2 | Status: SHIPPED | OUTPATIENT
Start: 2021-01-07 | End: 2021-06-25

## 2021-03-22 RX ORDER — ROSUVASTATIN CALCIUM 5 MG/1
5 TABLET, COATED ORAL DAILY
Qty: 90 TABLET | Refills: 3 | Status: SHIPPED | OUTPATIENT
Start: 2021-03-22 | End: 2021-11-16 | Stop reason: SDUPTHER

## 2021-03-23 DIAGNOSIS — I10 ESSENTIAL HYPERTENSION: Chronic | ICD-10-CM

## 2021-03-23 RX ORDER — AMLODIPINE BESYLATE 10 MG/1
10 TABLET ORAL DAILY
Qty: 30 TABLET | Refills: 11 | Status: SHIPPED | OUTPATIENT
Start: 2021-03-23 | End: 2022-01-28

## 2021-06-25 ENCOUNTER — OFFICE VISIT (OUTPATIENT)
Dept: FAMILY MEDICINE | Facility: CLINIC | Age: 76
End: 2021-06-25
Payer: MEDICARE

## 2021-06-25 VITALS
RESPIRATION RATE: 17 BRPM | WEIGHT: 168 LBS | HEIGHT: 64 IN | BODY MASS INDEX: 28.68 KG/M2 | OXYGEN SATURATION: 98 % | HEART RATE: 82 BPM | DIASTOLIC BLOOD PRESSURE: 76 MMHG | SYSTOLIC BLOOD PRESSURE: 130 MMHG

## 2021-06-25 DIAGNOSIS — G89.29 CHRONIC LEFT SHOULDER PAIN: Primary | ICD-10-CM

## 2021-06-25 DIAGNOSIS — M25.512 CHRONIC LEFT SHOULDER PAIN: Primary | ICD-10-CM

## 2021-06-25 PROCEDURE — 99214 PR OFFICE/OUTPT VISIT, EST, LEVL IV, 30-39 MIN: ICD-10-PCS | Mod: S$GLB,,, | Performed by: FAMILY MEDICINE

## 2021-06-25 PROCEDURE — 99214 OFFICE O/P EST MOD 30 MIN: CPT | Mod: S$GLB,,, | Performed by: FAMILY MEDICINE

## 2021-06-25 RX ORDER — GUAIFENESIN 1200 MG
1 TABLET, EXTENDED RELEASE 12 HR ORAL DAILY PRN
COMMUNITY

## 2021-07-16 ENCOUNTER — PATIENT OUTREACH (OUTPATIENT)
Dept: ADMINISTRATIVE | Facility: OTHER | Age: 76
End: 2021-07-16

## 2021-07-20 ENCOUNTER — OFFICE VISIT (OUTPATIENT)
Dept: ORTHOPEDICS | Facility: CLINIC | Age: 76
End: 2021-07-20
Payer: MEDICARE

## 2021-07-20 VITALS
OXYGEN SATURATION: 98 % | HEART RATE: 98 BPM | WEIGHT: 168 LBS | HEIGHT: 64 IN | RESPIRATION RATE: 18 BRPM | BODY MASS INDEX: 28.68 KG/M2

## 2021-07-20 DIAGNOSIS — M19.012 ARTHRITIS OF LEFT ACROMIOCLAVICULAR JOINT: ICD-10-CM

## 2021-07-20 DIAGNOSIS — M25.512 CHRONIC LEFT SHOULDER PAIN: ICD-10-CM

## 2021-07-20 DIAGNOSIS — M19.012 GLENOHUMERAL ARTHRITIS, LEFT: ICD-10-CM

## 2021-07-20 DIAGNOSIS — G89.29 CHRONIC LEFT SHOULDER PAIN: ICD-10-CM

## 2021-07-20 DIAGNOSIS — M75.82 ROTATOR CUFF TENDINITIS, LEFT: Primary | ICD-10-CM

## 2021-07-20 PROCEDURE — 20610 DRAIN/INJ JOINT/BURSA W/O US: CPT | Mod: LT,S$GLB,, | Performed by: ORTHOPAEDIC SURGERY

## 2021-07-20 PROCEDURE — 99999 PR PBB SHADOW E&M-EST. PATIENT-LVL III: ICD-10-PCS | Mod: PBBFAC,,, | Performed by: ORTHOPAEDIC SURGERY

## 2021-07-20 PROCEDURE — 99203 OFFICE O/P NEW LOW 30 MIN: CPT | Mod: 25,S$GLB,, | Performed by: ORTHOPAEDIC SURGERY

## 2021-07-20 PROCEDURE — 99999 PR PBB SHADOW E&M-EST. PATIENT-LVL III: CPT | Mod: PBBFAC,,, | Performed by: ORTHOPAEDIC SURGERY

## 2021-07-20 PROCEDURE — 99203 PR OFFICE/OUTPT VISIT, NEW, LEVL III, 30-44 MIN: ICD-10-PCS | Mod: 25,S$GLB,, | Performed by: ORTHOPAEDIC SURGERY

## 2021-07-20 PROCEDURE — 20610 LARGE JOINT ASPIRATION/INJECTION: L GLENOHUMERAL: ICD-10-PCS | Mod: LT,S$GLB,, | Performed by: ORTHOPAEDIC SURGERY

## 2021-07-20 RX ORDER — TRIAMCINOLONE ACETONIDE 40 MG/ML
40 INJECTION, SUSPENSION INTRA-ARTICULAR; INTRAMUSCULAR
Status: DISCONTINUED | OUTPATIENT
Start: 2021-07-20 | End: 2021-07-20 | Stop reason: HOSPADM

## 2021-07-20 RX ADMIN — TRIAMCINOLONE ACETONIDE 40 MG: 40 INJECTION, SUSPENSION INTRA-ARTICULAR; INTRAMUSCULAR at 01:07

## 2021-10-09 ENCOUNTER — HOSPITAL ENCOUNTER (EMERGENCY)
Facility: HOSPITAL | Age: 76
Discharge: HOME | End: 2021-10-09
Attending: FAMILY MEDICINE
Payer: MEDICARE

## 2021-10-09 VITALS
SYSTOLIC BLOOD PRESSURE: 136 MMHG | DIASTOLIC BLOOD PRESSURE: 72 MMHG | RESPIRATION RATE: 17 BRPM | TEMPERATURE: 98 F | HEIGHT: 64 IN | WEIGHT: 168 LBS | HEART RATE: 76 BPM | OXYGEN SATURATION: 98 % | BODY MASS INDEX: 28.68 KG/M2

## 2021-10-09 DIAGNOSIS — S40.862A INSECT BITE OF LEFT UPPER ARM, INITIAL ENCOUNTER: Primary | ICD-10-CM
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
## 2021-10-09 PROCEDURE — 63600175 PHARM REV CODE 636 W HCPCS: Performed by: NURSE PRACTITIONER

## 2021-10-09 PROCEDURE — 99284 EMERGENCY DEPT VISIT MOD MDM: CPT | Mod: 25

## 2021-10-09 PROCEDURE — 96372 THER/PROPH/DIAG INJ SC/IM: CPT

## 2021-10-09 RX ORDER — METHYLPREDNISOLONE 4 MG/1
TABLET ORAL
Qty: 1 PACKAGE | Refills: 0 | Status: SHIPPED | OUTPATIENT
Start: 2021-10-09 | End: 2021-10-30

## 2021-10-09 RX ORDER — DEXAMETHASONE SODIUM PHOSPHATE 10 MG/ML
10 INJECTION INTRAMUSCULAR; INTRAVENOUS
Status: COMPLETED | OUTPATIENT
Start: 2021-10-09 | End: 2021-10-09

## 2021-10-09 RX ORDER — METHYLPREDNISOLONE 4 MG/1
TABLET ORAL
Qty: 1 PACKAGE | Refills: 0 | Status: SHIPPED | OUTPATIENT
Start: 2021-10-09 | End: 2021-10-09 | Stop reason: SDUPTHER

## 2021-10-09 RX ADMIN — DEXAMETHASONE SODIUM PHOSPHATE 10 MG: 10 INJECTION INTRAMUSCULAR; INTRAVENOUS at 12:10

## 2021-10-29 ENCOUNTER — TELEPHONE (OUTPATIENT)
Dept: FAMILY MEDICINE | Facility: CLINIC | Age: 76
End: 2021-10-29
Payer: MEDICARE

## 2021-10-29 DIAGNOSIS — I10 ESSENTIAL HYPERTENSION: Chronic | ICD-10-CM

## 2021-10-29 DIAGNOSIS — E11.319 CONTROLLED TYPE 2 DIABETES MELLITUS WITH RETINOPATHY WITHOUT MACULAR EDEMA, WITHOUT LONG-TERM CURRENT USE OF INSULIN, UNSPECIFIED LATERALITY, UNSPECIFIED RETINOPATHY SEVERITY: Primary | Chronic | ICD-10-CM

## 2021-10-29 RX ORDER — METOPROLOL SUCCINATE 25 MG/1
25 TABLET, EXTENDED RELEASE ORAL DAILY
Qty: 90 TABLET | Refills: 3 | Status: SHIPPED | OUTPATIENT
Start: 2021-10-29 | End: 2022-03-16 | Stop reason: ALTCHOICE

## 2021-11-11 ENCOUNTER — OFFICE VISIT (OUTPATIENT)
Dept: FAMILY MEDICINE | Facility: CLINIC | Age: 76
End: 2021-11-11
Payer: MEDICARE

## 2021-11-11 VITALS
DIASTOLIC BLOOD PRESSURE: 75 MMHG | SYSTOLIC BLOOD PRESSURE: 133 MMHG | HEART RATE: 83 BPM | RESPIRATION RATE: 18 BRPM | WEIGHT: 168 LBS | HEIGHT: 64 IN | OXYGEN SATURATION: 98 % | BODY MASS INDEX: 28.68 KG/M2

## 2021-11-11 DIAGNOSIS — R53.83 OTHER FATIGUE: Primary | ICD-10-CM

## 2021-11-11 DIAGNOSIS — E11.319 CONTROLLED TYPE 2 DIABETES MELLITUS WITH RETINOPATHY WITHOUT MACULAR EDEMA, WITHOUT LONG-TERM CURRENT USE OF INSULIN, UNSPECIFIED LATERALITY, UNSPECIFIED RETINOPATHY SEVERITY: ICD-10-CM

## 2021-11-11 DIAGNOSIS — Z13.21 ENCOUNTER FOR VITAMIN DEFICIENCY SCREENING: ICD-10-CM

## 2021-11-11 DIAGNOSIS — E78.5 HYPERLIPIDEMIA WITH TARGET LDL LESS THAN 100: ICD-10-CM

## 2021-11-11 DIAGNOSIS — R41.3 MEMORY LOSS: ICD-10-CM

## 2021-11-11 DIAGNOSIS — I10 ESSENTIAL HYPERTENSION: ICD-10-CM

## 2021-11-11 PROCEDURE — 99999 PR PBB SHADOW E&M-EST. PATIENT-LVL III: ICD-10-PCS | Mod: PBBFAC,,, | Performed by: NURSE PRACTITIONER

## 2021-11-11 PROCEDURE — 99214 PR OFFICE/OUTPT VISIT, EST, LEVL IV, 30-39 MIN: ICD-10-PCS | Mod: S$GLB,,, | Performed by: NURSE PRACTITIONER

## 2021-11-11 PROCEDURE — 99999 PR PBB SHADOW E&M-EST. PATIENT-LVL III: CPT | Mod: PBBFAC,,, | Performed by: NURSE PRACTITIONER

## 2021-11-11 PROCEDURE — 99214 OFFICE O/P EST MOD 30 MIN: CPT | Mod: S$GLB,,, | Performed by: NURSE PRACTITIONER

## 2021-11-11 RX ORDER — MEMANTINE HYDROCHLORIDE 5 MG/1
5 TABLET ORAL 2 TIMES DAILY
Qty: 60 TABLET | Refills: 2 | Status: SHIPPED | OUTPATIENT
Start: 2021-11-11 | End: 2021-11-12

## 2021-11-15 ENCOUNTER — LAB VISIT (OUTPATIENT)
Dept: LAB | Facility: HOSPITAL | Age: 76
End: 2021-11-15
Attending: FAMILY MEDICINE
Payer: MEDICARE

## 2021-11-15 DIAGNOSIS — R53.83 OTHER FATIGUE: ICD-10-CM

## 2021-11-15 DIAGNOSIS — Z13.21 ENCOUNTER FOR VITAMIN DEFICIENCY SCREENING: ICD-10-CM

## 2021-11-15 DIAGNOSIS — E11.319 CONTROLLED TYPE 2 DIABETES MELLITUS WITH RETINOPATHY WITHOUT MACULAR EDEMA, WITHOUT LONG-TERM CURRENT USE OF INSULIN, UNSPECIFIED LATERALITY, UNSPECIFIED RETINOPATHY SEVERITY: Chronic | ICD-10-CM

## 2021-11-15 LAB
25(OH)D3+25(OH)D2 SERPL-MCNC: 64 NG/ML (ref 30–96)
ALBUMIN SERPL BCP-MCNC: 4.2 G/DL (ref 3.5–5.2)
ALP SERPL-CCNC: 83 U/L (ref 55–135)
ALT SERPL W/O P-5'-P-CCNC: 25 U/L (ref 10–44)
ANION GAP SERPL CALC-SCNC: 10 MMOL/L (ref 8–16)
AST SERPL-CCNC: 26 U/L (ref 10–40)
BASOPHILS # BLD AUTO: 0.04 K/UL (ref 0–0.2)
BASOPHILS NFR BLD: 0.7 % (ref 0–1.9)
BILIRUB SERPL-MCNC: 0.6 MG/DL (ref 0.1–1)
BUN SERPL-MCNC: 10 MG/DL (ref 8–23)
CALCIUM SERPL-MCNC: 9.2 MG/DL (ref 8.7–10.5)
CHLORIDE SERPL-SCNC: 105 MMOL/L (ref 95–110)
CHOLEST SERPL-MCNC: 278 MG/DL (ref 120–199)
CHOLEST/HDLC SERPL: 3.9 {RATIO} (ref 2–5)
CO2 SERPL-SCNC: 23 MMOL/L (ref 23–29)
CREAT SERPL-MCNC: 0.7 MG/DL (ref 0.5–1.4)
DIFFERENTIAL METHOD: ABNORMAL
EOSINOPHIL # BLD AUTO: 0.1 K/UL (ref 0–0.5)
EOSINOPHIL NFR BLD: 1.8 % (ref 0–8)
ERYTHROCYTE [DISTWIDTH] IN BLOOD BY AUTOMATED COUNT: 12.4 % (ref 11.5–14.5)
EST. GFR  (AFRICAN AMERICAN): >60 ML/MIN/1.73 M^2
EST. GFR  (NON AFRICAN AMERICAN): >60 ML/MIN/1.73 M^2
ESTIMATED AVG GLUCOSE: 126 MG/DL (ref 68–131)
FERRITIN SERPL-MCNC: 87 NG/ML (ref 20–300)
GLUCOSE SERPL-MCNC: 112 MG/DL (ref 70–110)
HBA1C MFR BLD: 6 % (ref 4–5.6)
HCT VFR BLD AUTO: 40.6 % (ref 37–48.5)
HDLC SERPL-MCNC: 71 MG/DL (ref 40–75)
HDLC SERPL: 25.5 % (ref 20–50)
HGB BLD-MCNC: 13.7 G/DL (ref 12–16)
IMM GRANULOCYTES # BLD AUTO: 0.02 K/UL (ref 0–0.04)
IMM GRANULOCYTES NFR BLD AUTO: 0.4 % (ref 0–0.5)
IRON SERPL-MCNC: 109 UG/DL (ref 30–160)
LDLC SERPL CALC-MCNC: 179.6 MG/DL (ref 63–159)
LYMPHOCYTES # BLD AUTO: 1.7 K/UL (ref 1–4.8)
LYMPHOCYTES NFR BLD: 30.5 % (ref 18–48)
MCH RBC QN AUTO: 29.7 PG (ref 27–31)
MCHC RBC AUTO-ENTMCNC: 33.7 G/DL (ref 32–36)
MCV RBC AUTO: 88 FL (ref 82–98)
MONOCYTES # BLD AUTO: 0.4 K/UL (ref 0.3–1)
MONOCYTES NFR BLD: 7.9 % (ref 4–15)
NEUTROPHILS # BLD AUTO: 3.3 K/UL (ref 1.8–7.7)
NEUTROPHILS NFR BLD: 58.7 % (ref 38–73)
NONHDLC SERPL-MCNC: 207 MG/DL
NRBC BLD-RTO: 0 /100 WBC
PLATELET # BLD AUTO: 332 K/UL (ref 150–450)
PMV BLD AUTO: 8.4 FL (ref 9.2–12.9)
POTASSIUM SERPL-SCNC: 4.4 MMOL/L (ref 3.5–5.1)
PROT SERPL-MCNC: 7.7 G/DL (ref 6–8.4)
RBC # BLD AUTO: 4.61 M/UL (ref 4–5.4)
SATURATED IRON: 30 % (ref 20–50)
SODIUM SERPL-SCNC: 138 MMOL/L (ref 136–145)
TOTAL IRON BINDING CAPACITY: 360 UG/DL (ref 250–450)
TRANSFERRIN SERPL-MCNC: 243 MG/DL (ref 200–375)
TRIGL SERPL-MCNC: 137 MG/DL (ref 30–150)
TSH SERPL DL<=0.005 MIU/L-ACNC: 2.28 UIU/ML (ref 0.4–4)
VIT B12 SERPL-MCNC: 635 PG/ML (ref 210–950)
WBC # BLD AUTO: 5.6 K/UL (ref 3.9–12.7)

## 2021-11-15 PROCEDURE — 82728 ASSAY OF FERRITIN: CPT | Performed by: NURSE PRACTITIONER

## 2021-11-15 PROCEDURE — 85025 COMPLETE CBC W/AUTO DIFF WBC: CPT | Performed by: FAMILY MEDICINE

## 2021-11-15 PROCEDURE — 80061 LIPID PANEL: CPT | Performed by: FAMILY MEDICINE

## 2021-11-15 PROCEDURE — 80053 COMPREHEN METABOLIC PANEL: CPT | Performed by: FAMILY MEDICINE

## 2021-11-15 PROCEDURE — 36415 COLL VENOUS BLD VENIPUNCTURE: CPT | Performed by: NURSE PRACTITIONER

## 2021-11-15 PROCEDURE — 82607 VITAMIN B-12: CPT | Performed by: NURSE PRACTITIONER

## 2021-11-15 PROCEDURE — 83036 HEMOGLOBIN GLYCOSYLATED A1C: CPT | Performed by: FAMILY MEDICINE

## 2021-11-15 PROCEDURE — 84443 ASSAY THYROID STIM HORMONE: CPT | Performed by: FAMILY MEDICINE

## 2021-11-15 PROCEDURE — 84466 ASSAY OF TRANSFERRIN: CPT | Performed by: NURSE PRACTITIONER

## 2021-11-15 PROCEDURE — 82306 VITAMIN D 25 HYDROXY: CPT | Performed by: NURSE PRACTITIONER

## 2021-11-16 DIAGNOSIS — E78.49 OTHER HYPERLIPIDEMIA: Primary | ICD-10-CM

## 2021-11-16 RX ORDER — ROSUVASTATIN CALCIUM 5 MG/1
5 TABLET, COATED ORAL DAILY
Qty: 90 TABLET | Refills: 3 | Status: SHIPPED | OUTPATIENT
Start: 2021-11-16 | End: 2022-03-16 | Stop reason: SDUPTHER

## 2022-03-10 ENCOUNTER — TELEPHONE (OUTPATIENT)
Dept: ORTHOPEDICS | Facility: CLINIC | Age: 77
End: 2022-03-10
Payer: MEDICARE

## 2022-03-10 DIAGNOSIS — G89.29 CHRONIC LEFT SHOULDER PAIN: ICD-10-CM

## 2022-03-10 DIAGNOSIS — M75.82 ROTATOR CUFF TENDINITIS, LEFT: Primary | ICD-10-CM

## 2022-03-10 DIAGNOSIS — M25.512 CHRONIC LEFT SHOULDER PAIN: ICD-10-CM

## 2022-03-11 ENCOUNTER — OFFICE VISIT (OUTPATIENT)
Dept: ORTHOPEDICS | Facility: CLINIC | Age: 77
End: 2022-03-11
Payer: COMMERCIAL

## 2022-03-11 ENCOUNTER — HOSPITAL ENCOUNTER (OUTPATIENT)
Dept: RADIOLOGY | Facility: HOSPITAL | Age: 77
Discharge: HOME | End: 2022-03-11
Attending: ORTHOPAEDIC SURGERY
Payer: COMMERCIAL

## 2022-03-11 VITALS — HEIGHT: 64 IN | WEIGHT: 168 LBS | BODY MASS INDEX: 28.68 KG/M2 | RESPIRATION RATE: 16 BRPM

## 2022-03-11 DIAGNOSIS — M75.82 ROTATOR CUFF TENDINITIS, LEFT: ICD-10-CM

## 2022-03-11 DIAGNOSIS — G89.29 CHRONIC LEFT SHOULDER PAIN: ICD-10-CM

## 2022-03-11 DIAGNOSIS — M75.82 ROTATOR CUFF TENDINITIS, LEFT: Primary | ICD-10-CM

## 2022-03-11 DIAGNOSIS — M25.512 CHRONIC LEFT SHOULDER PAIN: ICD-10-CM

## 2022-03-11 DIAGNOSIS — M75.22 TENDONITIS OF UPPER BICEPS TENDON OF LEFT SHOULDER: ICD-10-CM

## 2022-03-11 DIAGNOSIS — M19.012 GLENOHUMERAL ARTHRITIS, LEFT: ICD-10-CM

## 2022-03-11 DIAGNOSIS — M19.012 ARTHRITIS OF LEFT ACROMIOCLAVICULAR JOINT: ICD-10-CM

## 2022-03-11 PROCEDURE — 3288F PR FALLS RISK ASSESSMENT DOCUMENTED: ICD-10-PCS | Mod: CPTII,S$GLB,, | Performed by: ORTHOPAEDIC SURGERY

## 2022-03-11 PROCEDURE — 1159F MED LIST DOCD IN RCRD: CPT | Mod: CPTII,S$GLB,, | Performed by: ORTHOPAEDIC SURGERY

## 2022-03-11 PROCEDURE — 99999 PR PBB SHADOW E&M-EST. PATIENT-LVL III: CPT | Mod: PBBFAC,,, | Performed by: ORTHOPAEDIC SURGERY

## 2022-03-11 PROCEDURE — 20610 DRAIN/INJ JOINT/BURSA W/O US: CPT | Mod: LT,S$GLB,, | Performed by: ORTHOPAEDIC SURGERY

## 2022-03-11 PROCEDURE — 99999 PR PBB SHADOW E&M-EST. PATIENT-LVL III: ICD-10-PCS | Mod: PBBFAC,,, | Performed by: ORTHOPAEDIC SURGERY

## 2022-03-11 PROCEDURE — 99213 OFFICE O/P EST LOW 20 MIN: CPT | Mod: 25,S$GLB,, | Performed by: ORTHOPAEDIC SURGERY

## 2022-03-11 PROCEDURE — 73030 X-RAY EXAM OF SHOULDER: CPT | Mod: 26,LT,, | Performed by: RADIOLOGY

## 2022-03-11 PROCEDURE — 1101F PR PT FALLS ASSESS DOC 0-1 FALLS W/OUT INJ PAST YR: ICD-10-PCS | Mod: CPTII,S$GLB,, | Performed by: ORTHOPAEDIC SURGERY

## 2022-03-11 PROCEDURE — 1101F PT FALLS ASSESS-DOCD LE1/YR: CPT | Mod: CPTII,S$GLB,, | Performed by: ORTHOPAEDIC SURGERY

## 2022-03-11 PROCEDURE — 73030 XR SHOULDER TRAUMA 3 VIEW LEFT: ICD-10-PCS | Mod: 26,LT,, | Performed by: RADIOLOGY

## 2022-03-11 PROCEDURE — 3288F FALL RISK ASSESSMENT DOCD: CPT | Mod: CPTII,S$GLB,, | Performed by: ORTHOPAEDIC SURGERY

## 2022-03-11 PROCEDURE — 99213 PR OFFICE/OUTPT VISIT, EST, LEVL III, 20-29 MIN: ICD-10-PCS | Mod: 25,S$GLB,, | Performed by: ORTHOPAEDIC SURGERY

## 2022-03-11 PROCEDURE — 1125F PR PAIN SEVERITY QUANTIFIED, PAIN PRESENT: ICD-10-PCS | Mod: CPTII,S$GLB,, | Performed by: ORTHOPAEDIC SURGERY

## 2022-03-11 PROCEDURE — 1159F PR MEDICATION LIST DOCUMENTED IN MEDICAL RECORD: ICD-10-PCS | Mod: CPTII,S$GLB,, | Performed by: ORTHOPAEDIC SURGERY

## 2022-03-11 PROCEDURE — 1125F AMNT PAIN NOTED PAIN PRSNT: CPT | Mod: CPTII,S$GLB,, | Performed by: ORTHOPAEDIC SURGERY

## 2022-03-11 PROCEDURE — 20610 LARGE JOINT ASPIRATION/INJECTION: L GLENOHUMERAL: ICD-10-PCS | Mod: LT,S$GLB,, | Performed by: ORTHOPAEDIC SURGERY

## 2022-03-11 PROCEDURE — 73030 X-RAY EXAM OF SHOULDER: CPT | Mod: TC,PN,LT

## 2022-03-11 RX ORDER — TRIAMCINOLONE ACETONIDE 40 MG/ML
40 INJECTION, SUSPENSION INTRA-ARTICULAR; INTRAMUSCULAR
Status: DISCONTINUED | OUTPATIENT
Start: 2022-03-11 | End: 2022-03-11 | Stop reason: HOSPADM

## 2022-03-11 RX ADMIN — TRIAMCINOLONE ACETONIDE 40 MG: 40 INJECTION, SUSPENSION INTRA-ARTICULAR; INTRAMUSCULAR at 02:03

## 2022-03-11 NOTE — PROCEDURES
Large Joint Aspiration/Injection: L glenohumeral    Date/Time: 3/11/2022 2:15 PM  Performed by: Eric Bartlett DO  Authorized by: Eric Bartlett, DO     Consent Done?:  Yes (Verbal)  Indications:  Diagnostic evaluation and pain  Site marked: the procedure site was marked    Timeout: prior to procedure the correct patient, procedure, and site was verified    Prep: patient was prepped and draped in usual sterile fashion      Details:  Needle Size:  22 G  Ultrasonic Guidance for needle placement?: No    Approach:  Posterior  Location:  Shoulder  Site:  L glenohumeral  Medications:  40 mg triamcinolone acetonide 40 mg/mL  Patient tolerance:  Patient tolerated the procedure well with no immediate complications

## 2022-03-11 NOTE — PROGRESS NOTES
Subjective:      Patient ID: Bernarda Perera is a 77 y.o. female.    Chief Complaint: Pain of the Left Shoulder      HPI:  Ms. Perera returns today with complaints of recurrent pain in her left shoulder.  At her last visit on 07/20/2021 she was given a steroid injection which gave her relief until approximately 1 month ago in and since that time she is in getting progressive increasing pain in her left shoulder.  She states she frequently picks her dogs up.  Her symptoms awaken her at night.  Forward flexion in abduction increases her symptoms.  She has taken NSAIDs without resolution of her pain.    ROS:  No new diagnosis/surgery/prescriptions since last office visit on 07/20/2021.  Constitutional: Negative for chills and fever.   HENT: Negative for congestion.    Eyes: Negative for blurred vision.   Cardiovascular: Negative for chest pain.   Respiratory: Negative for cough and shortness of breath.    Endocrine: Negative for polydipsia.   Hematologic/Lymphatic: Negative for adenopathy.   Skin: Negative for flushing, itching and skin cancer.   Musculoskeletal: Positive for joint pain. Negative for gout.   Gastrointestinal: Negative for constipation, diarrhea and heartburn.   Genitourinary: Negative for nocturia.   Neurological: Positive for headaches. Negative for seizures.   Psychiatric/Behavioral: Positive for depression. Negative for substance abuse. The patient is not nervous/anxious.    Allergic/Immunologic: Negative for environmental allergies.       Objective:      Physical Exam:   General: AAOx3.  No acute distress  Vascular:  Pulses intact and equal bilaterally.  Capillary refill less than 3 seconds and equal bilaterally  Neurologic:  Pinprick and soft touch intact and equal bilaterally  Integment:  No ecchymosis, no errythema  Extremity: Shoulder:  Forward flexion/abduction equal bilaterally 0/168 degrees.  Internal rotation near equal bilaterally T9.  Negative drop-arm both shoulders.  Negative lift-off both  shoulders.  External rotation equal bilaterally 0/30 degrees.  Full can negative both shoulders.  Empty can negative both shoulders.  Canales/Neer positive left shoulder.  Cross-arm negative both shoulders.  Nontender over the AC joint bilaterally.  Nontender in the bicipital groove bilaterally.  Yergason's negative bilaterally.  Apprehension/relocation negative bilaterally.  Radiography:  Personally reviewed x-rays of the left shoulder completed on 03/11/2022 showed AC arthritis, glenohumeral arthritis, sclerosis of the greater tuberosity consistent with impingement.      Assessment:       Impression:     1. Rotator cuff tendinitis, left    2. Tendonitis of upper biceps tendon of left shoulder    3. Arthritis of left acromioclavicular joint    4. Glenohumeral arthritis, left    5. Chronic left shoulder pain          Plan:       1.  Discussed physical examination and radiographic findings with the patient. Bernarda understands that she has rotator cuff tendinitis along with biceps tendinitis and arthritis of her shoulder.  Treatment alternatives and outcomes were discussed with the patient she understands she could continue to be treated with conservative modality such as observation, activity modification, NSAIDs, bracing, physical therapy, injections, or she could consider surgical intervention such as arthroscopy.  She stated that she had a good results with steroid injection in the past and would prefer to trial conservative management a little bit longer before considering surgical intervention.  2. Offered a steroid injection left shoulder, she elected to proceed.  3. Continue with NSAIDs as tolerated allowed by PCM.  4. Continue with home exercises previously shown.  5. Follow up p.r.n..

## 2022-03-16 ENCOUNTER — TELEPHONE (OUTPATIENT)
Dept: FAMILY MEDICINE | Facility: CLINIC | Age: 77
End: 2022-03-16
Payer: COMMERCIAL

## 2022-03-16 ENCOUNTER — OFFICE VISIT (OUTPATIENT)
Dept: FAMILY MEDICINE | Facility: CLINIC | Age: 77
End: 2022-03-16
Payer: COMMERCIAL

## 2022-03-16 VITALS
DIASTOLIC BLOOD PRESSURE: 79 MMHG | SYSTOLIC BLOOD PRESSURE: 157 MMHG | HEART RATE: 73 BPM | OXYGEN SATURATION: 99 % | HEIGHT: 64 IN | BODY MASS INDEX: 28.94 KG/M2 | WEIGHT: 169.5 LBS

## 2022-03-16 DIAGNOSIS — I10 ESSENTIAL HYPERTENSION: ICD-10-CM

## 2022-03-16 DIAGNOSIS — E78.49 OTHER HYPERLIPIDEMIA: ICD-10-CM

## 2022-03-16 DIAGNOSIS — R41.3 MEMORY LOSS: ICD-10-CM

## 2022-03-16 DIAGNOSIS — E11.319 CONTROLLED TYPE 2 DIABETES MELLITUS WITH RETINOPATHY WITHOUT MACULAR EDEMA, WITHOUT LONG-TERM CURRENT USE OF INSULIN, UNSPECIFIED LATERALITY, UNSPECIFIED RETINOPATHY SEVERITY: Primary | Chronic | ICD-10-CM

## 2022-03-16 LAB
ALBUMIN/CREAT UR: 9.4 UG/MG (ref 0–30)
CREAT UR-MCNC: 139 MG/DL (ref 15–325)
MICROALBUMIN UR DL<=1MG/L-MCNC: 13 UG/ML

## 2022-03-16 PROCEDURE — 3288F PR FALLS RISK ASSESSMENT DOCUMENTED: ICD-10-PCS | Mod: CPTII,S$GLB,, | Performed by: FAMILY MEDICINE

## 2022-03-16 PROCEDURE — 1160F PR REVIEW ALL MEDS BY PRESCRIBER/CLIN PHARMACIST DOCUMENTED: ICD-10-PCS | Mod: CPTII,S$GLB,, | Performed by: FAMILY MEDICINE

## 2022-03-16 PROCEDURE — 3288F FALL RISK ASSESSMENT DOCD: CPT | Mod: CPTII,S$GLB,, | Performed by: FAMILY MEDICINE

## 2022-03-16 PROCEDURE — 3078F PR MOST RECENT DIASTOLIC BLOOD PRESSURE < 80 MM HG: ICD-10-PCS | Mod: CPTII,S$GLB,, | Performed by: FAMILY MEDICINE

## 2022-03-16 PROCEDURE — 99214 PR OFFICE/OUTPT VISIT, EST, LEVL IV, 30-39 MIN: ICD-10-PCS | Mod: S$GLB,,, | Performed by: FAMILY MEDICINE

## 2022-03-16 PROCEDURE — 3077F SYST BP >= 140 MM HG: CPT | Mod: CPTII,S$GLB,, | Performed by: FAMILY MEDICINE

## 2022-03-16 PROCEDURE — 1101F PR PT FALLS ASSESS DOC 0-1 FALLS W/OUT INJ PAST YR: ICD-10-PCS | Mod: CPTII,S$GLB,, | Performed by: FAMILY MEDICINE

## 2022-03-16 PROCEDURE — 1160F RVW MEDS BY RX/DR IN RCRD: CPT | Mod: CPTII,S$GLB,, | Performed by: FAMILY MEDICINE

## 2022-03-16 PROCEDURE — 1126F AMNT PAIN NOTED NONE PRSNT: CPT | Mod: CPTII,S$GLB,, | Performed by: FAMILY MEDICINE

## 2022-03-16 PROCEDURE — 1126F PR PAIN SEVERITY QUANTIFIED, NO PAIN PRESENT: ICD-10-PCS | Mod: CPTII,S$GLB,, | Performed by: FAMILY MEDICINE

## 2022-03-16 PROCEDURE — 1159F MED LIST DOCD IN RCRD: CPT | Mod: CPTII,S$GLB,, | Performed by: FAMILY MEDICINE

## 2022-03-16 PROCEDURE — 3077F PR MOST RECENT SYSTOLIC BLOOD PRESSURE >= 140 MM HG: ICD-10-PCS | Mod: CPTII,S$GLB,, | Performed by: FAMILY MEDICINE

## 2022-03-16 PROCEDURE — 1159F PR MEDICATION LIST DOCUMENTED IN MEDICAL RECORD: ICD-10-PCS | Mod: CPTII,S$GLB,, | Performed by: FAMILY MEDICINE

## 2022-03-16 PROCEDURE — 82043 UR ALBUMIN QUANTITATIVE: CPT | Performed by: FAMILY MEDICINE

## 2022-03-16 PROCEDURE — 3078F DIAST BP <80 MM HG: CPT | Mod: CPTII,S$GLB,, | Performed by: FAMILY MEDICINE

## 2022-03-16 PROCEDURE — 99999 PR PBB SHADOW E&M-EST. PATIENT-LVL III: CPT | Mod: PBBFAC,,, | Performed by: FAMILY MEDICINE

## 2022-03-16 PROCEDURE — 99999 PR PBB SHADOW E&M-EST. PATIENT-LVL III: ICD-10-PCS | Mod: PBBFAC,,, | Performed by: FAMILY MEDICINE

## 2022-03-16 PROCEDURE — 1101F PT FALLS ASSESS-DOCD LE1/YR: CPT | Mod: CPTII,S$GLB,, | Performed by: FAMILY MEDICINE

## 2022-03-16 PROCEDURE — 82570 ASSAY OF URINE CREATININE: CPT | Performed by: FAMILY MEDICINE

## 2022-03-16 PROCEDURE — 99214 OFFICE O/P EST MOD 30 MIN: CPT | Mod: S$GLB,,, | Performed by: FAMILY MEDICINE

## 2022-03-16 RX ORDER — AMLODIPINE BESYLATE 5 MG/1
5 TABLET ORAL DAILY
Qty: 90 TABLET | Refills: 1 | Status: SHIPPED | OUTPATIENT
Start: 2022-03-16 | End: 2022-03-17 | Stop reason: SDUPTHER

## 2022-03-16 RX ORDER — ROSUVASTATIN CALCIUM 5 MG/1
5 TABLET, COATED ORAL DAILY
Qty: 90 TABLET | Refills: 3 | Status: SHIPPED | OUTPATIENT
Start: 2022-03-16 | End: 2022-03-17 | Stop reason: SDUPTHER

## 2022-03-16 RX ORDER — ATENOLOL AND CHLORTHALIDONE TABLET 50; 25 MG/1; MG/1
1 TABLET ORAL DAILY
Qty: 90 TABLET | Refills: 1 | Status: SHIPPED | OUTPATIENT
Start: 2022-03-16 | End: 2022-03-17 | Stop reason: SDUPTHER

## 2022-03-16 RX ORDER — MEMANTINE HYDROCHLORIDE 10 MG/1
10 TABLET ORAL 2 TIMES DAILY
Qty: 60 TABLET | Refills: 5 | Status: SHIPPED | OUTPATIENT
Start: 2022-03-16 | End: 2022-03-17 | Stop reason: SDUPTHER

## 2022-03-16 NOTE — TELEPHONE ENCOUNTER
Spoke with pharmacy, changed from 30 days to 90 days. Pharmacy voiced understanding.      ----- Message from June Rodriguez DO sent at 3/16/2022 10:18 AM CDT -----  Please ask [facility] to [facility] recent rx's sent over to 30 day scripts, since i'll soon be sending new rx's to a mail order pharmacy. Thanks

## 2022-03-16 NOTE — Clinical Note
Please ask [facility] to change recent rx's sent over to 30 day scripts, since i'll soon be sending new rx's to a mail order pharmacy. Thanks

## 2022-03-16 NOTE — PROGRESS NOTES
Subjective:       Patient ID: Bernarda Perera is a 77 y.o. female.    Chief Complaint: Follow-up (4 mth f/u. Pt consents to foot exam and diabetes urine screening. ), Memory Loss ( states she forgets things alot), Leg Swelling (Pt states her ankles and legs have been swelling off and on. ), and Joint Swelling    HPI   4 month follow-up. Has issues with memory loss and frequently forgetting things. Long term memory mostly preserved. Her  reports she frequently consumes alcohol in the evenings. She also admits to forgetting to take her memantine several times a week.    Occasional ankle swelling, bilaterally, in the evenings. Worse with walking around or being on her feet a lot or with eating certain things. No associated pain.    Review of Systems   Constitutional: Negative for chills and fever.   Cardiovascular: Positive for leg swelling. Negative for chest pain and palpitations.   Neurological:        Decr short term memory   Psychiatric/Behavioral: Negative for hallucinations.       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, not stated as uncontrolled(250.50)     Type II or unspecified type diabetes mellitus without mention of complication, not stated as uncontrolled     diet controlled    Urinary incontinence     Varicose veins      Past Surgical History:   Procedure Laterality Date    BELT ABDOMINOPLASTY      BREAST CYST EXCISION       SECTION, LOW TRANSVERSE      x1     Social History     Socioeconomic History    Marital status:     Number of children: 3   Occupational History    Occupation:    Tobacco Use    Smoking status: Former Smoker    Smokeless tobacco: Never Used  "  Substance and Sexual Activity    Alcohol use: Yes     Comment: once a week    Drug use: Never    Sexual activity: Not Currently     Partners: Male     Birth control/protection: Post-menopausal   Social History Narrative    Originally from [place]. Moved to the [place] in .     Family History   Problem Relation Age of Onset    Cirrhosis Father         ; he was an alcoholic    Hypertension Unknown     Diabetes type II Unknown     Breast cancer Maternal Aunt        Objective:      BP (!) 157/79 (BP Location: Right arm, Patient Position: Sitting, BP Method: Large (Automatic))   Pulse 73   Ht 5' 4" (1.626 m)   Wt 76.9 kg (169 lb 8 oz)   LMP  (LMP Unknown)   SpO2 99%   BMI 29.09 kg/m²   Physical Exam    Assessment:       1. 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 Other hyperlipidemia    3. Memory loss    4. Essential hypertension        Plan:       Adjusting meds below (decr amlodipine, resume rosuvastatin, incr memantin, switch metoprolol to atenolol-chlorthalidone). Labs in 3 months, f/u 1 week later.    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  Dispense: 90 tablet; Refill: 3    Memory loss  -     memantine (NAMENDA) 10 MG Tab; Take 1 tablet (10 mg total) by mouth 2 (two) times daily.  Dispense: 60 tablet; Refill: 5    Essential hypertension  -     atenoloL-chlorthalidone (TENORETIC) " 50-25 mg Tab; Take 1 tablet by mouth once daily.  Dispense: 90 tablet; Refill: 1  -     amLODIPine (NORVASC) 5 MG tablet; Take 1 tablet (5 mg total) by mouth once daily.  Dispense: 90 tablet; Refill: 1            Risks, benefits, and side effects were discussed with the patient. All questions were answered to the fullest satisfaction of the patient, and pt verbalized understanding and agreement to treatment plan. Pt was to call with any new or worsening symptoms, or present to the ER.

## 2022-03-17 DIAGNOSIS — R41.3 MEMORY LOSS: ICD-10-CM

## 2022-03-17 DIAGNOSIS — E78.49 OTHER HYPERLIPIDEMIA: ICD-10-CM

## 2022-03-17 DIAGNOSIS — I10 ESSENTIAL HYPERTENSION: ICD-10-CM

## 2022-03-17 RX ORDER — ROSUVASTATIN CALCIUM 5 MG/1
5 TABLET, COATED ORAL DAILY
Qty: 90 TABLET | Refills: 3 | Status: SHIPPED | OUTPATIENT
Start: 2022-03-17 | End: 2022-04-07

## 2022-03-17 RX ORDER — ATENOLOL AND CHLORTHALIDONE TABLET 50; 25 MG/1; MG/1
1 TABLET ORAL DAILY
Qty: 90 TABLET | Refills: 1 | Status: SHIPPED | OUTPATIENT
Start: 2022-03-17 | End: 2022-04-07

## 2022-03-17 RX ORDER — MEMANTINE HYDROCHLORIDE 10 MG/1
10 TABLET ORAL 2 TIMES DAILY
Qty: 60 TABLET | Refills: 5 | Status: SHIPPED | OUTPATIENT
Start: 2022-03-17 | End: 2022-04-07

## 2022-03-17 RX ORDER — AMLODIPINE BESYLATE 5 MG/1
5 TABLET ORAL DAILY
Qty: 90 TABLET | Refills: 1 | Status: SHIPPED | OUTPATIENT
Start: 2022-03-17 | End: 2022-04-07

## 2022-03-17 NOTE — TELEPHONE ENCOUNTER
----- Message from Corry Koch MA sent at 3/16/2022  3:33 PM CDT -----  Contact: pt at [phone]    ----- Message -----  From: Jairo Coleman  Sent: 3/16/2022  11:14 AM CDT  To: Michael Watkins Staff    Type: Needs Medical Advice  Who Called: pt  Best Call Back Number: [phone]  Additional Information: pt is calling the office to get the phone number and fax for the [facility] pharmacy to send their Rx's to, Phone number: [phone] Fax number: [phone]. Please call back and advise.

## 2022-11-12 ENCOUNTER — PATIENT MESSAGE (OUTPATIENT)
Dept: ADMINISTRATIVE | Facility: HOSPITAL | Age: 77
End: 2022-11-12
Payer: COMMERCIAL

## 2022-11-13 ENCOUNTER — PATIENT MESSAGE (OUTPATIENT)
Dept: FAMILY MEDICINE | Facility: CLINIC | Age: 77
End: 2022-11-13
Payer: COMMERCIAL

## 2022-12-03 ENCOUNTER — HOSPITAL ENCOUNTER (EMERGENCY)
Facility: HOSPITAL | Age: 77
Discharge: HOME | End: 2022-12-03
Attending: INTERNAL MEDICINE
Payer: COMMERCIAL

## 2022-12-03 VITALS
RESPIRATION RATE: 18 BRPM | WEIGHT: 155 LBS | HEIGHT: 64 IN | HEART RATE: 86 BPM | DIASTOLIC BLOOD PRESSURE: 87 MMHG | OXYGEN SATURATION: 99 % | BODY MASS INDEX: 26.46 KG/M2 | TEMPERATURE: 98 F | SYSTOLIC BLOOD PRESSURE: 176 MMHG
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
## 2022-12-03 DIAGNOSIS — S09.90XA INJURY OF HEAD, INITIAL ENCOUNTER: Primary | ICD-10-CM

## 2022-12-03 LAB
BASOPHILS # BLD AUTO: 0.04 K/UL (ref 0–0.2)
BASOPHILS NFR BLD: 0.5 % (ref 0–1.9)
DIFFERENTIAL METHOD: ABNORMAL
EOSINOPHIL # BLD AUTO: 0.1 K/UL (ref 0–0.5)
EOSINOPHIL NFR BLD: 1.2 % (ref 0–8)
ERYTHROCYTE [DISTWIDTH] IN BLOOD BY AUTOMATED COUNT: 12.5 % (ref 11.5–14.5)
HCT VFR BLD AUTO: 39.4 % (ref 37–48.5)
HCV AB SERPL QL IA: NORMAL
HGB BLD-MCNC: 13.5 G/DL (ref 12–16)
HIV 1+2 AB+HIV1 P24 AG SERPL QL IA: NORMAL
IMM GRANULOCYTES # BLD AUTO: 0.03 K/UL (ref 0–0.04)
IMM GRANULOCYTES NFR BLD AUTO: 0.4 % (ref 0–0.5)
LYMPHOCYTES # BLD AUTO: 2.2 K/UL (ref 1–4.8)
LYMPHOCYTES NFR BLD: 26.8 % (ref 18–48)
MCH RBC QN AUTO: 30.7 PG (ref 27–31)
MCHC RBC AUTO-ENTMCNC: 34.3 G/DL (ref 32–36)
MCV RBC AUTO: 90 FL (ref 82–98)
MONOCYTES # BLD AUTO: 0.6 K/UL (ref 0.3–1)
MONOCYTES NFR BLD: 7.3 % (ref 4–15)
NEUTROPHILS # BLD AUTO: 5.3 K/UL (ref 1.8–7.7)
NEUTROPHILS NFR BLD: 63.8 % (ref 38–73)
NRBC BLD-RTO: 0 /100 WBC
PLATELET # BLD AUTO: 326 K/UL (ref 150–450)
PMV BLD AUTO: 8.5 FL (ref 9.2–12.9)
RBC # BLD AUTO: 4.4 M/UL (ref 4–5.4)
WBC # BLD AUTO: 8.27 K/UL (ref 3.9–12.7)

## 2022-12-03 PROCEDURE — 87389 HIV-1 AG W/HIV-1&-2 AB AG IA: CPT | Performed by: INTERNAL MEDICINE

## 2022-12-03 PROCEDURE — 70486 CT MAXILLOFACIAL WITHOUT CONTRAST: ICD-10-PCS | Mod: 26,,, | Performed by: RADIOLOGY

## 2022-12-03 PROCEDURE — 99284 EMERGENCY DEPT VISIT MOD MDM: CPT | Mod: 25

## 2022-12-03 PROCEDURE — 70450 CT HEAD/BRAIN W/O DYE: CPT | Mod: 26,,, | Performed by: RADIOLOGY

## 2022-12-03 PROCEDURE — 36415 COLL VENOUS BLD VENIPUNCTURE: CPT | Performed by: INTERNAL MEDICINE

## 2022-12-03 PROCEDURE — 70450 CT HEAD WITHOUT CONTRAST: ICD-10-PCS | Mod: 26,,, | Performed by: RADIOLOGY

## 2022-12-03 PROCEDURE — 85025 COMPLETE CBC W/AUTO DIFF WBC: CPT | Performed by: INTERNAL MEDICINE

## 2022-12-03 PROCEDURE — 70450 CT HEAD/BRAIN W/O DYE: CPT | Mod: TC

## 2022-12-03 PROCEDURE — 86803 HEPATITIS C AB TEST: CPT | Performed by: INTERNAL MEDICINE

## 2022-12-03 PROCEDURE — 70486 CT MAXILLOFACIAL W/O DYE: CPT | Mod: TC

## 2022-12-03 PROCEDURE — 70486 CT MAXILLOFACIAL W/O DYE: CPT | Mod: 26,,, | Performed by: RADIOLOGY

## 2022-12-03 NOTE — ED PROVIDER NOTES
Encounter Date: 12/3/2022       History     Chief Complaint   Patient presents with    Fall     Trip and fall. Left eye swelling. Possible loss of consciousness. ASA daily. Not sure what happened. States that she woke up in her bed.     Patient comes in complaining of fall and bruises to her left face.  The patient states she does not remember falling last night or what time.  When she got up this morning she noticed that she had bruising around her eye.  Her son that he last talked to her at around 10:30 p.m. last night and saw her earlier today when she called him that she would fallen.  She denies any pain except over eye, there is no chest pain shortness breath headache paralysis paresthesias history of seizures incontinence urine or bowel.  Patient is ambulatory.      In reviewing old chart the patient does have a history of dementia and apparently is on medicine that she forgets to take.  There is a history of drinking alcohol in the evenings.  She presently is not being seen by a neurologist.      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, not stated as uncontrolled(250.50)     Type II or unspecified type diabetes mellitus without mention of complication, not stated as uncontrolled     diet controlled    Urinary incontinence     Varicose veins      Past Surgical History:   Procedure Laterality Date    BELT ABDOMINOPLASTY      BREAST CYST EXCISION       SECTION, LOW TRANSVERSE      x1     Family History   Problem Relation Age of  Onset    Cirrhosis Father         ; he was an alcoholic    Hypertension Unknown     Diabetes type II Unknown     Breast cancer Maternal Aunt      Social History     Tobacco Use    Smoking status: Former    Smokeless tobacco: Never   Substance Use Topics    Alcohol use: Yes     Comment: once a week    Drug use: Never     Review of Systems   Constitutional:  Negative for fever.   HENT:  Negative for sore throat.    Respiratory:  Negative for shortness of breath.    Cardiovascular:  Negative for chest pain.   Gastrointestinal:  Negative for nausea.   Genitourinary:  Negative for dysuria.   Musculoskeletal:  Negative for back pain.   Skin:  Negative for rash.   Neurological:  Negative for weakness.   Hematological:  Does not bruise/bleed easily.     Physical Exam     Initial Vitals [22 1258]   BP Pulse Resp Temp SpO2   (!) 176/87 86 18 97.8 °F (36.6 °C) 99 %      MAP       --         Physical Exam    Vitals reviewed.  Constitutional: She appears well-developed.   HENT:   Head:       Bruise ecchymoses around the inferior orbit on the left side.  No other injuries.   Eyes: Pupils are equal, round, and reactive to light.   Neck: Trachea normal. Neck supple.   Normal range of motion.   Full passive range of motion without pain.     Cardiovascular:  Normal rate, regular rhythm, normal heart sounds and normal pulses.           Pulmonary/Chest: Effort normal and breath sounds normal.   Abdominal: Abdomen is soft.   Musculoskeletal:         General: Normal range of motion.      Cervical back: Full passive range of motion without pain, normal range of motion and neck supple.     Neurological: She is alert. She has normal strength and normal reflexes. No cranial nerve deficit or sensory deficit. GCS eye subscore is 4. GCS verbal subscore is 5. GCS motor subscore is 6.   Patient ambulatory without assistive devices and vision shows no acute abnormalities   Skin: Skin is warm.   Psychiatric: She has a normal mood and  affect.       ED Course   Procedures  Labs Reviewed   CBC W/ AUTO DIFFERENTIAL - Abnormal; Notable for the following components:       Result Value    MPV 8.5 (*)     All other components within normal limits    Narrative:     Release to patient->Immediate   HIV 1 / 2 ANTIBODY   HEPATITIS C ANTIBODY          Imaging Results              CT Head Without Contrast (Final result)  Result time 12/03/22 13:44:31      Final result by Sander Grigsby MD (12/03/22 13:44:31)                   Impression:      1. No evidence of an acute intracranial abnormality.  2. No evidence of acute fracture in the maxillofacial bones, orbits, or paranasal sinuses.  3. Left periorbital/supraorbital soft tissue swelling and hematoma formation.      Electronically signed by: Sander Grigsby  Date:    12/03/2022  Time:    13:44               Narrative:    EXAMINATION:  CT HEAD WITHOUT CONTRAST; CT MAXILLOFACIAL WITHOUT CONTRAST    CLINICAL HISTORY:  Fall;    TECHNIQUE:  Low dose CT images throughout the region of the head and facial bones.  Axial, sagittal and coronal reformations were obtained.  Contrast was not administered.    COMPARISON:  None    FINDINGS:  Head:    Mild prominence of the ventricles and sulci compatible with generalized cerebral volume loss.  No hydrocephalus.    Mild scattered hypoattenuation within the supratentorial white matter, nonspecific but probably reflecting sequelae of chronic microvascular ischemic change.   No parenchymal mass, hemorrhage or recent or remote major vascular distribution infarct.    No extra-axial blood or fluid collections.    The calvarium appears intact, without evidence of depressed skull fracture.    Facial bones:    Prominent left periorbital/supraorbital soft tissue swelling and hematoma formation.  The globes and intraorbital contents are within normal limits.  No orbital fracture.    The remainder of the facial bones appear intact without evidence of an acute displaced fracture.  No osseous  destructive lesions.    Temporomandibular joints appropriately position without evidence of dislocation.    Paranasal sinuses essentially clear, noting minimal scattered mucosal membrane thickening within the maxillary and ethmoid sinuses.  The nasal septum is mildly deviated to the left with associated osseous spur..  Mastoids are clear.    Degenerative change of the cervical spine.                                       CT Maxillofacial Without Contrast (Final result)  Result time 12/03/22 13:44:31      Final result by Sander Grigsby MD (12/03/22 13:44:31)                   Impression:      1. No evidence of an acute intracranial abnormality.  2. No evidence of acute fracture in the maxillofacial bones, orbits, or paranasal sinuses.  3. Left periorbital/supraorbital soft tissue swelling and hematoma formation.      Electronically signed by: Sander Grigsby  Date:    12/03/2022  Time:    13:44               Narrative:    EXAMINATION:  CT HEAD WITHOUT CONTRAST; CT MAXILLOFACIAL WITHOUT CONTRAST    CLINICAL HISTORY:  Fall;    TECHNIQUE:  Low dose CT images throughout the region of the head and facial bones.  Axial, sagittal and coronal reformations were obtained.  Contrast was not administered.    COMPARISON:  None    FINDINGS:  Head:    Mild prominence of the ventricles and sulci compatible with generalized cerebral volume loss.  No hydrocephalus.    Mild scattered hypoattenuation within the supratentorial white matter, nonspecific but probably reflecting sequelae of chronic microvascular ischemic change.   No parenchymal mass, hemorrhage or recent or remote major vascular distribution infarct.    No extra-axial blood or fluid collections.    The calvarium appears intact, without evidence of depressed skull fracture.    Facial bones:    Prominent left periorbital/supraorbital soft tissue swelling and hematoma formation.  The globes and intraorbital contents are within normal limits.  No orbital fracture.    The remainder  of the facial bones appear intact without evidence of an acute displaced fracture.  No osseous destructive lesions.    Temporomandibular joints appropriately position without evidence of dislocation.    Paranasal sinuses essentially clear, noting minimal scattered mucosal membrane thickening within the maxillary and ethmoid sinuses.  The nasal septum is mildly deviated to the left with associated osseous spur..  Mastoids are clear.    Degenerative change of the cervical spine.                                       Medications - No data to display  Medical Decision Making:   ED Management:  Patient's CT scan of the head maxilla is negative and patient has no focal neurologic deficits at present.  Advised follow back up with her primary care provider for further workup evaluation.  Also need to see neurologist as well.           ED Course as of 12/03/22 1350   Sat Dec 03, 2022   1346 CBC auto differential(!) [PW]   1349 CT Head Without Contrast [PW]   1349 CT Maxillofacial Without Contrast [PW]      ED Course User Index  [PW] Jonathan Adams MD                 Clinical Impression:   Final diagnoses:  [S09.90XA] Injury of head, initial encounter (Primary)  [W19.XXXA] Fall, initial encounter      ED Disposition Condition    Discharge Stable          ED Prescriptions    None       Follow-up Information       Follow up With Specialties Details Why Contact Info    June Rodriguez DO Urgent Care, Family Medicine In 2 days  [address]  [place] [id number]               Jonathan Adams MD  12/03/22 1350

## 2023-01-03 ENCOUNTER — PATIENT MESSAGE (OUTPATIENT)
Dept: FAMILY MEDICINE | Facility: CLINIC | Age: 78
End: 2023-01-03

## 2023-01-03 ENCOUNTER — OFFICE VISIT (OUTPATIENT)
Dept: FAMILY MEDICINE | Facility: CLINIC | Age: 78
End: 2023-01-03
Payer: MEDICARE

## 2023-01-03 VITALS
SYSTOLIC BLOOD PRESSURE: 130 MMHG | OXYGEN SATURATION: 98 % | WEIGHT: 174.63 LBS | BODY MASS INDEX: 29.81 KG/M2 | HEART RATE: 75 BPM | RESPIRATION RATE: 18 BRPM | HEIGHT: 64 IN | DIASTOLIC BLOOD PRESSURE: 76 MMHG | TEMPERATURE: 98 F

## 2023-01-03 DIAGNOSIS — R21 RASH: Primary | ICD-10-CM

## 2023-01-03 PROCEDURE — 1126F PR PAIN SEVERITY QUANTIFIED, NO PAIN PRESENT: ICD-10-PCS | Mod: CPTII,S$GLB,, | Performed by: FAMILY MEDICINE

## 2023-01-03 PROCEDURE — 1100F PR PT FALLS ASSESS DOC 2+ FALLS/FALL W/INJURY/YR: ICD-10-PCS | Mod: CPTII,S$GLB,, | Performed by: FAMILY MEDICINE

## 2023-01-03 PROCEDURE — 1160F PR REVIEW ALL MEDS BY PRESCRIBER/CLIN PHARMACIST DOCUMENTED: ICD-10-PCS | Mod: CPTII,S$GLB,, | Performed by: FAMILY MEDICINE

## 2023-01-03 PROCEDURE — 1159F PR MEDICATION LIST DOCUMENTED IN MEDICAL RECORD: ICD-10-PCS | Mod: CPTII,S$GLB,, | Performed by: FAMILY MEDICINE

## 2023-01-03 PROCEDURE — 99999 PR PBB SHADOW E&M-EST. PATIENT-LVL IV: CPT | Mod: PBBFAC,,, | Performed by: FAMILY MEDICINE

## 2023-01-03 PROCEDURE — 3075F SYST BP GE 130 - 139MM HG: CPT | Mod: CPTII,S$GLB,, | Performed by: FAMILY MEDICINE

## 2023-01-03 PROCEDURE — 1160F RVW MEDS BY RX/DR IN RCRD: CPT | Mod: CPTII,S$GLB,, | Performed by: FAMILY MEDICINE

## 2023-01-03 PROCEDURE — 3288F FALL RISK ASSESSMENT DOCD: CPT | Mod: CPTII,S$GLB,, | Performed by: FAMILY MEDICINE

## 2023-01-03 PROCEDURE — 3078F DIAST BP <80 MM HG: CPT | Mod: CPTII,S$GLB,, | Performed by: FAMILY MEDICINE

## 2023-01-03 PROCEDURE — 1100F PTFALLS ASSESS-DOCD GE2>/YR: CPT | Mod: CPTII,S$GLB,, | Performed by: FAMILY MEDICINE

## 2023-01-03 PROCEDURE — 1159F MED LIST DOCD IN RCRD: CPT | Mod: CPTII,S$GLB,, | Performed by: FAMILY MEDICINE

## 2023-01-03 PROCEDURE — 1126F AMNT PAIN NOTED NONE PRSNT: CPT | Mod: CPTII,S$GLB,, | Performed by: FAMILY MEDICINE

## 2023-01-03 PROCEDURE — 96372 THER/PROPH/DIAG INJ SC/IM: CPT | Mod: S$GLB,,, | Performed by: FAMILY MEDICINE

## 2023-01-03 PROCEDURE — 99214 OFFICE O/P EST MOD 30 MIN: CPT | Mod: 25,S$GLB,, | Performed by: FAMILY MEDICINE

## 2023-01-03 PROCEDURE — 3078F PR MOST RECENT DIASTOLIC BLOOD PRESSURE < 80 MM HG: ICD-10-PCS | Mod: CPTII,S$GLB,, | Performed by: FAMILY MEDICINE

## 2023-01-03 PROCEDURE — 3288F PR FALLS RISK ASSESSMENT DOCUMENTED: ICD-10-PCS | Mod: CPTII,S$GLB,, | Performed by: FAMILY MEDICINE

## 2023-01-03 PROCEDURE — 96372 PR INJECTION,THERAP/PROPH/DIAG2ST, IM OR SUBCUT: ICD-10-PCS | Mod: S$GLB,,, | Performed by: FAMILY MEDICINE

## 2023-01-03 PROCEDURE — 99999 PR PBB SHADOW E&M-EST. PATIENT-LVL IV: ICD-10-PCS | Mod: PBBFAC,,, | Performed by: FAMILY MEDICINE

## 2023-01-03 PROCEDURE — 99214 PR OFFICE/OUTPT VISIT, EST, LEVL IV, 30-39 MIN: ICD-10-PCS | Mod: 25,S$GLB,, | Performed by: FAMILY MEDICINE

## 2023-01-03 PROCEDURE — 3075F PR MOST RECENT SYSTOLIC BLOOD PRESS GE 130-139MM HG: ICD-10-PCS | Mod: CPTII,S$GLB,, | Performed by: FAMILY MEDICINE

## 2023-01-03 RX ORDER — DEXAMETHASONE SODIUM PHOSPHATE 4 MG/ML
4 INJECTION, SOLUTION INTRA-ARTICULAR; INTRALESIONAL; INTRAMUSCULAR; INTRAVENOUS; SOFT TISSUE
Status: COMPLETED | OUTPATIENT
Start: 2023-01-03 | End: 2023-01-03

## 2023-01-03 RX ORDER — METRONIDAZOLE 7.5 MG/G
GEL TOPICAL 2 TIMES DAILY
Qty: 45 G | Refills: 0 | Status: SHIPPED | OUTPATIENT
Start: 2023-01-03 | End: 2023-04-12

## 2023-01-03 RX ORDER — TRIAMCINOLONE ACETONIDE 1 MG/G
CREAM TOPICAL
COMMUNITY
Start: 2022-10-17 | End: 2023-01-03 | Stop reason: SDUPTHER

## 2023-01-03 RX ORDER — HYDROCORTISONE 25 MG/G
CREAM TOPICAL
COMMUNITY
Start: 2022-10-17 | End: 2023-01-03

## 2023-01-03 RX ORDER — TRIAMCINOLONE ACETONIDE 1 MG/G
CREAM TOPICAL 2 TIMES DAILY
Qty: 45 G | Refills: 0 | Status: SHIPPED | OUTPATIENT
Start: 2023-01-03 | End: 2024-01-11 | Stop reason: SDUPTHER

## 2023-01-03 RX ADMIN — DEXAMETHASONE SODIUM PHOSPHATE 4 MG: 4 INJECTION, SOLUTION INTRA-ARTICULAR; INTRALESIONAL; INTRAMUSCULAR; INTRAVENOUS; SOFT TISSUE at 02:01

## 2023-01-03 NOTE — PROGRESS NOTES
[facility] - Clinic Note    Subjective      Ms. Perera is a 77 y.o. female who presents to clinic for Rash and Fall    Patient reports that for the last 4 months she has had a rash on her face and her hands.  She saw a dermatologist who apparently did a biopsy which was nonconclusive.  She was given steroid creams which have not been helpful.    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   PSX Bernarda has a past surgical history that includes  section, low transverse; Belt abdominoplasty; and Breast cyst excision.    Bernarda's family history includes Breast cancer in her maternal aunt; Cirrhosis in her father; Diabetes type II in her unknown relative; Hypertension in her unknown relative.    Bernarda reports that she has quit smoking. She has never used smokeless tobacco. She reports current alcohol use. She reports that she does not use drugs.   MARIANNA Menchaca is allergic to ace inhibitors and sulfa (sulfonamide antibiotics).   MED Bernarda has a current medication list which includes the following prescription(s): acetaminophen, amlodipine, aspirin, atenolol-chlorthalidone, memantine, omega-3 fatty acids, rosuvastatin, metronidazole, and triamcinolone acetonide 0.1%, and the following Facility-Administered Medications: dexamethasone.     Review of Systems   Constitutional:  Negative for chills and fever.   Respiratory:  Negative for shortness of breath.    Cardiovascular:  Negative for chest pain.   Skin:  Positive for rash.   Objective     /76 (BP Location: Left arm, Patient  "Position: Sitting, BP Method: Large (Manual))   Pulse 75   Temp 98 °F (36.7 °C) (Temporal)   Resp 18   Ht 5' 4" (1.626 m)   Wt 79.2 kg (174 lb 9.6 oz)   LMP  (LMP Unknown)   SpO2 98%   BMI 29.97 kg/m²     Physical Exam  Vitals and nursing note reviewed.   Constitutional:       General: She is not in acute distress.     Appearance: Normal appearance. She is well-developed. She is not diaphoretic.   HENT:      Head: Normocephalic and atraumatic.      Right Ear: External ear normal.      Left Ear: External ear normal.   Eyes:      General:         Right eye: No discharge.         Left eye: No discharge.   Cardiovascular:      Rate and Rhythm: Normal rate and regular rhythm.      Heart sounds: Normal heart sounds.   Pulmonary:      Effort: Pulmonary effort is normal.      Breath sounds: Normal breath sounds. No wheezing or rales.   Skin:     General: Skin is warm and dry.      Findings: Rash present.   Neurological:      Mental Status: She is alert and oriented to person, place, and time. Mental status is at baseline.   Psychiatric:         Mood and Affect: Mood normal.         Behavior: Behavior normal.         Thought Content: Thought content normal.         Judgment: Judgment normal.      Assessment/Plan     1. Rash  Ambulatory referral/consult to Dermatology    dexAMETHasone injection 4 mg    metroNIDAZOLE (METROGEL) 0.75 % gel    triamcinolone acetonide 0.1% (KENALOG) 0.1 % cream        Referral to Dermatology has been placed.  Decadron injection today.  Trial menstrual gel on the face and triamcinolone cream on the hands.    Future Appointments   Date Time Provider Department Center   1/3/2023  4:40 PM Shawn Baez MD [facility]         Shawn Baez MD  Family Medicine  [facility] [place]            "

## 2023-01-05 ENCOUNTER — PATIENT MESSAGE (OUTPATIENT)
Dept: ADMINISTRATIVE | Facility: HOSPITAL | Age: 78
End: 2023-01-05
Payer: MEDICARE

## 2023-01-10 DIAGNOSIS — R41.3 MEMORY LOSS: ICD-10-CM

## 2023-01-10 DIAGNOSIS — I10 ESSENTIAL HYPERTENSION: ICD-10-CM

## 2023-01-10 RX ORDER — ATENOLOL AND CHLORTHALIDONE TABLET 50; 25 MG/1; MG/1
1 TABLET ORAL DAILY
Qty: 90 TABLET | Refills: 1 | Status: CANCELLED | OUTPATIENT
Start: 2023-01-10

## 2023-01-10 RX ORDER — AMLODIPINE BESYLATE 5 MG/1
5 TABLET ORAL DAILY
Qty: 90 TABLET | Refills: 1 | Status: CANCELLED | OUTPATIENT
Start: 2023-01-10

## 2023-01-10 RX ORDER — MEMANTINE HYDROCHLORIDE 10 MG/1
10 TABLET ORAL 2 TIMES DAILY
Qty: 180 TABLET | Refills: 1 | Status: CANCELLED | OUTPATIENT
Start: 2023-01-10

## 2023-01-31 ENCOUNTER — PATIENT MESSAGE (OUTPATIENT)
Dept: FAMILY MEDICINE | Facility: CLINIC | Age: 78
End: 2023-01-31
Payer: MEDICARE

## 2023-02-11 ENCOUNTER — PATIENT MESSAGE (OUTPATIENT)
Dept: ADMINISTRATIVE | Facility: HOSPITAL | Age: 78
End: 2023-02-11
Payer: MEDICARE

## 2023-03-06 ENCOUNTER — PATIENT OUTREACH (OUTPATIENT)
Dept: ADMINISTRATIVE | Facility: HOSPITAL | Age: 78
End: 2023-03-06
Payer: MEDICARE

## 2023-03-06 NOTE — LETTER
March 6, 2023    Bernarda Perera  [id number]  [place] [address]             [facility]  [address] [facility] [address]  Phone: [phone] Bernarda MARROQUIN Perera    [facility] provider, June Rodriguez DO is no longer practicing as full time primary care provider at [facility]. When she returns from leave she will be providing urgent care services periodically for the time being. We are thankful for the excellent care she has provided to her patients and our community.    To make the transition to a new provider as seamless as possible, we encourage you to continue your care with [facility]. To ensure the continuity of your care, June Rodriguez DO patients will be seamlessly transitioned to one of several Family Medicine providers within our group practice.    Listed below are providers we would like you to consider as you resume your care with [facility]. Information about the unique qualifications and special areas of interest held by our physicians can be found at [email or web address].     Providers:         Location:        Isaiah Robbins MD       [facility]     MD Soha Doty MD Nicole Lafontaine, NP is not paneling patients             We are confident that you will continue to receive the excellent treatment and service to which you are accustomed. As always, your medical records are available electronically to any of our providers, ensuring no disruption in your care.  If you need an appointment, please call [phone] to schedule with the team. You can also schedule appointments online at [email or web address].    We also have [facility] clincs in:    [facility], [place]  [place], [place]  [place], [place]  [place], [place]  [place], [place]    We value the trust you have placed in [facility] in allowing us  the privilege of caring for you and your familys medical needs.      If you decided not to continue to use [facility] for your Primary Care needs, please call [phone] to let us know so you can be removed from any Outreach list.     Sincerely,    [facility]    Jennifer Sahni LPN  Performance Improvement Coordinator  [facility] [address] [place] [address] [phone]         yes/High Nutrition Risk.

## 2023-03-06 NOTE — PROGRESS NOTES
Outreached pt about Dr Rodriguez no longer seeing patients as a PCP in [facility], and that an establish care appointment needed for a new Primary Care Provider. Updated care team at this time.

## 2023-03-31 ENCOUNTER — TELEPHONE (OUTPATIENT)
Dept: NEUROLOGY | Facility: CLINIC | Age: 78
End: 2023-03-31
Payer: MEDICARE

## 2023-04-03 ENCOUNTER — TELEPHONE (OUTPATIENT)
Dept: NEUROLOGY | Facility: CLINIC | Age: 78
End: 2023-04-03
Payer: MEDICARE

## 2023-04-03 NOTE — TELEPHONE ENCOUNTER
Offered the pt's  several appts in April and May.  He wanted an appt, later in the day then I could offer.  Given the number to [facility] neurology for a later appt.

## 2023-04-12 ENCOUNTER — OFFICE VISIT (OUTPATIENT)
Dept: NEUROLOGY | Facility: CLINIC | Age: 78
End: 2023-04-12
Payer: MEDICARE

## 2023-04-12 VITALS
DIASTOLIC BLOOD PRESSURE: 71 MMHG | HEIGHT: 64 IN | HEART RATE: 58 BPM | SYSTOLIC BLOOD PRESSURE: 135 MMHG | BODY MASS INDEX: 29.34 KG/M2 | WEIGHT: 171.88 LBS

## 2023-04-12 DIAGNOSIS — R41.3 OTHER AMNESIA: Primary | ICD-10-CM

## 2023-04-12 DIAGNOSIS — R41.9 UNSPECIFIED SYMPTOMS AND SIGNS INVOLVING COGNITIVE FUNCTIONS AND AWARENESS: ICD-10-CM

## 2023-04-12 DIAGNOSIS — G31.84 MILD COGNITIVE IMPAIRMENT WITH MEMORY LOSS: ICD-10-CM

## 2023-04-12 DIAGNOSIS — G30.8 OTHER ALZHEIMER'S DISEASE (CODE): ICD-10-CM

## 2023-04-12 PROCEDURE — 99204 PR OFFICE/OUTPT VISIT, NEW, LEVL IV, 45-59 MIN: ICD-10-PCS | Mod: S$GLB,,, | Performed by: STUDENT IN AN ORGANIZED HEALTH CARE EDUCATION/TRAINING PROGRAM

## 2023-04-12 PROCEDURE — 1101F PR PT FALLS ASSESS DOC 0-1 FALLS W/OUT INJ PAST YR: ICD-10-PCS | Mod: CPTII,S$GLB,, | Performed by: STUDENT IN AN ORGANIZED HEALTH CARE EDUCATION/TRAINING PROGRAM

## 2023-04-12 PROCEDURE — 1159F MED LIST DOCD IN RCRD: CPT | Mod: CPTII,S$GLB,, | Performed by: STUDENT IN AN ORGANIZED HEALTH CARE EDUCATION/TRAINING PROGRAM

## 2023-04-12 PROCEDURE — 99999 PR PBB SHADOW E&M-EST. PATIENT-LVL IV: CPT | Mod: PBBFAC,,, | Performed by: STUDENT IN AN ORGANIZED HEALTH CARE EDUCATION/TRAINING PROGRAM

## 2023-04-12 PROCEDURE — 1159F PR MEDICATION LIST DOCUMENTED IN MEDICAL RECORD: ICD-10-PCS | Mod: CPTII,S$GLB,, | Performed by: STUDENT IN AN ORGANIZED HEALTH CARE EDUCATION/TRAINING PROGRAM

## 2023-04-12 PROCEDURE — 1160F PR REVIEW ALL MEDS BY PRESCRIBER/CLIN PHARMACIST DOCUMENTED: ICD-10-PCS | Mod: CPTII,S$GLB,, | Performed by: STUDENT IN AN ORGANIZED HEALTH CARE EDUCATION/TRAINING PROGRAM

## 2023-04-12 PROCEDURE — 99999 PR PBB SHADOW E&M-EST. PATIENT-LVL IV: ICD-10-PCS | Mod: PBBFAC,,, | Performed by: STUDENT IN AN ORGANIZED HEALTH CARE EDUCATION/TRAINING PROGRAM

## 2023-04-12 PROCEDURE — 1160F RVW MEDS BY RX/DR IN RCRD: CPT | Mod: CPTII,S$GLB,, | Performed by: STUDENT IN AN ORGANIZED HEALTH CARE EDUCATION/TRAINING PROGRAM

## 2023-04-12 PROCEDURE — 1101F PT FALLS ASSESS-DOCD LE1/YR: CPT | Mod: CPTII,S$GLB,, | Performed by: STUDENT IN AN ORGANIZED HEALTH CARE EDUCATION/TRAINING PROGRAM

## 2023-04-12 PROCEDURE — 99204 OFFICE O/P NEW MOD 45 MIN: CPT | Mod: S$GLB,,, | Performed by: STUDENT IN AN ORGANIZED HEALTH CARE EDUCATION/TRAINING PROGRAM

## 2023-04-12 PROCEDURE — 3078F DIAST BP <80 MM HG: CPT | Mod: CPTII,S$GLB,, | Performed by: STUDENT IN AN ORGANIZED HEALTH CARE EDUCATION/TRAINING PROGRAM

## 2023-04-12 PROCEDURE — 3288F PR FALLS RISK ASSESSMENT DOCUMENTED: ICD-10-PCS | Mod: CPTII,S$GLB,, | Performed by: STUDENT IN AN ORGANIZED HEALTH CARE EDUCATION/TRAINING PROGRAM

## 2023-04-12 PROCEDURE — 1126F AMNT PAIN NOTED NONE PRSNT: CPT | Mod: CPTII,S$GLB,, | Performed by: STUDENT IN AN ORGANIZED HEALTH CARE EDUCATION/TRAINING PROGRAM

## 2023-04-12 PROCEDURE — 3075F SYST BP GE 130 - 139MM HG: CPT | Mod: CPTII,S$GLB,, | Performed by: STUDENT IN AN ORGANIZED HEALTH CARE EDUCATION/TRAINING PROGRAM

## 2023-04-12 PROCEDURE — 3075F PR MOST RECENT SYSTOLIC BLOOD PRESS GE 130-139MM HG: ICD-10-PCS | Mod: CPTII,S$GLB,, | Performed by: STUDENT IN AN ORGANIZED HEALTH CARE EDUCATION/TRAINING PROGRAM

## 2023-04-12 PROCEDURE — 1126F PR PAIN SEVERITY QUANTIFIED, NO PAIN PRESENT: ICD-10-PCS | Mod: CPTII,S$GLB,, | Performed by: STUDENT IN AN ORGANIZED HEALTH CARE EDUCATION/TRAINING PROGRAM

## 2023-04-12 PROCEDURE — 3288F FALL RISK ASSESSMENT DOCD: CPT | Mod: CPTII,S$GLB,, | Performed by: STUDENT IN AN ORGANIZED HEALTH CARE EDUCATION/TRAINING PROGRAM

## 2023-04-12 PROCEDURE — 3078F PR MOST RECENT DIASTOLIC BLOOD PRESSURE < 80 MM HG: ICD-10-PCS | Mod: CPTII,S$GLB,, | Performed by: STUDENT IN AN ORGANIZED HEALTH CARE EDUCATION/TRAINING PROGRAM

## 2023-04-12 RX ORDER — DONEPEZIL HYDROCHLORIDE 5 MG/1
5 TABLET, FILM COATED ORAL NIGHTLY
Qty: 30 TABLET | Refills: 11 | Status: SHIPPED | OUTPATIENT
Start: 2023-04-12 | End: 2024-01-11 | Stop reason: SDUPTHER

## 2023-04-12 NOTE — PROGRESS NOTES
[facility] - NEUROLOGY 7TH [place] [facility] [place]    Date: 4/12/23  Patient Name: Bernarda Perera   MRN: [id number]   PCP: Primary Doctor No  Referring Provider: Self, Aaareferral    Assessment:   Bernarda Perera is a 78 y.o. female presenting for evaluation of progressive memory complaints.  Overall score on MOCA today is suggestive of mild cognitive impairment, although there is the limitation of english being her second language.  Will check labs for reversible causes, obtain MRI brain, refer to neuropsychology for further evaluation, and start donepezil trial.      Plan:     Problem List Items Addressed This Visit    None  Visit Diagnoses       Other amnesia    -  Primary    Relevant Orders    MRI Brain Without Contrast (Completed)    Mild cognitive impairment with memory loss        Relevant Orders    Vitamin B12 (Completed)    TSH (Completed)    RPR (Completed)    HIV 1/2 Ag/Ab (4th Gen) (Completed)    FOLATE (Completed)    Vitamin B1 (Completed)    Ambulatory referral/consult to Adult Neuropsychology    EKG 12-lead (Completed)    Unspecified symptoms and signs involving cognitive functions and awareness        Relevant Orders    Vitamin B12 (Completed)    Other Alzheimer's disease (CODE)        Relevant Orders    FOLATE (Completed)            Tracey Huoser MD  [facility]   Department of Neurology      Patient note was created using MModal Dictation.  Any errors in syntax or even information may not have been identified and edited on initial review prior to signing this note.  Subjective:        HPI:   Ms. Bernarda Perera is a 78 y.o. female presenting for evaluation for memory problems.    Her  reports that memory problems have been occurring for a while.  Gradual, more noticeable, occurring at least for the past year.  He noticed that she would forget whether the dogs had been fed, repeating questions.  Another example -  gave her 100 dollars and  she forgot she had any cash.  No problems recognizing people or getting lost.    Sometimes she does forget her medications.  Does sometimes forget what day it is.  Still driving, no problems with driving.      She was started on namenda 10 mg daily for her PCP.      Patient does report that she does not have many close friends near her.  Spends the day caring for her dogs and the land (3 acres).  Still cooking, doing laundry and household chores.  Is misplacing things around the house quite often.  She does watch a fair amount of TV.      Mood: reports that she does get depressed quite often.  Reports that she was taking a medication occasionally, but now starting to take it every day.    Sleep; gets about 10 pm to 6-7 in the morning.  She does wake up a lot during the night.    Drinks 2-3 beers per night.      Used to work as a  in [place]. Originally from [place].  Primary residence is in [place], husbands works in [place] multiple days a week.      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      amLODIPine (NORVASC) 5 MG tablet  "TAKE 1 TABLET BY MOUTH EVERY DAY 90 tablet 1    aspirin (ECOTRIN) 81 MG EC tablet Take 81 mg by mouth once daily.      atenoloL-chlorthalidone (TENORETIC) 50-25 mg Tab TAKE 1 TABLET BY MOUTH EVERY DAY 90 tablet 1    memantine (NAMENDA) 10 MG Tab TAKE 1 TABLET BY MOUTH TWICE A  tablet 1    omega-3 fatty acids 1,000 mg Cap Take 1 capsule by mouth Daily. 1 Capsule Oral Every day      triamcinolone acetonide 0.1% (KENALOG) 0.1 % cream Apply topically 2 (two) times daily. 45 g 0    metroNIDAZOLE (METROGEL) 0.75 % gel Apply topically 2 (two) times daily. (Patient not taking: Reported on 2023) 45 g 0    rosuvastatin (CRESTOR) 5 MG tablet TAKE 1 TABLET BY MOUTH EVERY DAY 30 tablet 11     No current facility-administered medications for this visit.       ALLERGIES:  Review of patient's allergies indicates:   Allergen Reactions    Ace inhibitors Other (See Comments)     cough    Sulfa (sulfonamide antibiotics) Other (See Comments)     Other reaction(s): Unknown       FAMILY HISTORY:  Family History   Problem Relation Age of Onset    Cirrhosis Father         ; he was an alcoholic    Hypertension Unknown     Diabetes type II Unknown     Breast cancer Maternal Aunt        SOCIAL HISTORY:  Social History     Tobacco Use    Smoking status: Former    Smokeless tobacco: Never   Substance Use Topics    Alcohol use: Yes     Comment: once a week    Drug use: Never       Review of Systems:  12 system review of systems is negative except for the symptoms mentioned in HPI.      Objective:     Vitals:    23 1102   BP: 135/71   Pulse: (!) 58   Weight: 78 kg (171 lb 13.6 oz)   Height: 5' 4" (1.626 m)     General: NAD, well nourished   Eyes: no tearing, discharge, no erythema   ENT: moist mucous membranes of the oral cavity, nares patent    Neck: Supple, full range of motion  Cardiovascular: Warm and well perfused, pulses equal and symmetrical  Lungs: Normal work of breathing, normal chest wall excursions  Skin: No " rash, lesions, or breakdown on exposed skin  Psychiatry: Mood and affect are appropriate   Abdomen: soft, non tender, non distended  Extremeties: No cyanosis, clubbing or edema.    Neurological   MENTAL STATUS: Alert and oriented to person, place, and time. Attention and concentration within normal limits. Speech without dysarthria, able to name and repeat without difficulty. Recent and remote memory within normal limits   CRANIAL NERVES: Visual fields intact. PERRL. EOMI. Facial sensation intact. Face symmetrical. Hearing grossly intact. Full shoulder shrug bilaterally. Tongue protrudes midline   SENSORY: Sensation is intact to light touch throughout.  Joint position perception intact. Negative Romberg.   MOTOR: Normal bulk and tone. No pronator drift.  5/5 deltoid, biceps, triceps, interosseous, hand  bilaterally. 5/5 iliopsoas, knee extension/flexion, foot dorsi/plantarflexion bilaterally.    REFLEXES: Symmetric and 2+ throughout. Toes down going bilaterally.   CEREBELLAR/COORDINATION/GAIT: Gait steady with normal arm swing and stride length.  Heel to shin intact. Finger to nose intact. Normal rapid alternating movements.

## 2023-04-12 NOTE — PATIENT INSTRUCTIONS
VISIT FOLLOW UP    It was nice to see you today.  Here is what we discussed at your visit:    You were seen today for memory problems. We will check some studies to further investigate this:  MRI brain   Blood work (to look for any vitamin deficiencies)  Start Donepezil 5 mg once daily  Referral to neuropsychology for cognitive testing  Follow up in 2-3 months    Dr. LARSEN's contact information: office phone [phone], or contact via [employer]

## 2023-04-18 ENCOUNTER — HOSPITAL ENCOUNTER (OUTPATIENT)
Dept: CARDIOLOGY | Facility: HOSPITAL | Age: 78
Discharge: HOME | End: 2023-04-18
Attending: STUDENT IN AN ORGANIZED HEALTH CARE EDUCATION/TRAINING PROGRAM
Payer: MEDICARE

## 2023-04-18 ENCOUNTER — HOSPITAL ENCOUNTER (OUTPATIENT)
Dept: RADIOLOGY | Facility: HOSPITAL | Age: 78
Discharge: HOME | End: 2023-04-18
Attending: STUDENT IN AN ORGANIZED HEALTH CARE EDUCATION/TRAINING PROGRAM
Payer: MEDICARE

## 2023-04-18 DIAGNOSIS — R41.3 OTHER AMNESIA: ICD-10-CM

## 2023-04-18 DIAGNOSIS — G31.84 MILD COGNITIVE IMPAIRMENT WITH MEMORY LOSS: ICD-10-CM

## 2023-04-18 PROCEDURE — 70551 MRI BRAIN WITHOUT CONTRAST: ICD-10-PCS | Mod: 26,,, | Performed by: RADIOLOGY

## 2023-04-18 PROCEDURE — 93010 ELECTROCARDIOGRAM REPORT: CPT | Mod: ,,, | Performed by: INTERNAL MEDICINE

## 2023-04-18 PROCEDURE — 93010 EKG 12-LEAD: ICD-10-PCS | Mod: ,,, | Performed by: INTERNAL MEDICINE

## 2023-04-18 PROCEDURE — 70551 MRI BRAIN STEM W/O DYE: CPT | Mod: 26,,, | Performed by: RADIOLOGY

## 2023-04-18 PROCEDURE — 93005 ELECTROCARDIOGRAM TRACING: CPT

## 2023-04-18 PROCEDURE — 70551 MRI BRAIN STEM W/O DYE: CPT | Mod: TC

## 2023-04-19 ENCOUNTER — TELEPHONE (OUTPATIENT)
Dept: NEUROLOGY | Facility: CLINIC | Age: 78
End: 2023-04-19
Payer: MEDICARE

## 2023-04-25 ENCOUNTER — TELEPHONE (OUTPATIENT)
Dept: NEUROLOGY | Facility: CLINIC | Age: 78
End: 2023-04-25
Payer: MEDICARE

## 2023-04-26 ENCOUNTER — TELEPHONE (OUTPATIENT)
Dept: NEUROLOGY | Facility: CLINIC | Age: 78
End: 2023-04-26
Payer: MEDICARE

## 2023-07-10 DIAGNOSIS — R41.3 MEMORY LOSS: ICD-10-CM

## 2023-07-10 RX ORDER — MEMANTINE HYDROCHLORIDE 10 MG/1
10 TABLET ORAL 2 TIMES DAILY
Qty: 180 TABLET | Refills: 3 | Status: SHIPPED | OUTPATIENT
Start: 2023-07-10 | End: 2024-01-11 | Stop reason: SDUPTHER

## 2023-08-10 ENCOUNTER — OFFICE VISIT (OUTPATIENT)
Dept: FAMILY MEDICINE | Facility: CLINIC | Age: 78
End: 2023-08-10
Payer: MEDICARE

## 2023-08-10 VITALS
WEIGHT: 170 LBS | RESPIRATION RATE: 20 BRPM | BODY MASS INDEX: 29.02 KG/M2 | HEART RATE: 75 BPM | DIASTOLIC BLOOD PRESSURE: 80 MMHG | OXYGEN SATURATION: 97 % | SYSTOLIC BLOOD PRESSURE: 138 MMHG | HEIGHT: 64 IN

## 2023-08-10 DIAGNOSIS — E11.319 CONTROLLED TYPE 2 DIABETES MELLITUS WITH RETINOPATHY WITHOUT MACULAR EDEMA, WITHOUT LONG-TERM CURRENT USE OF INSULIN, UNSPECIFIED LATERALITY, UNSPECIFIED RETINOPATHY SEVERITY: Chronic | ICD-10-CM

## 2023-08-10 DIAGNOSIS — E78.5 HYPERLIPIDEMIA WITH TARGET LDL LESS THAN 100: Chronic | ICD-10-CM

## 2023-08-10 DIAGNOSIS — G31.84 MILD COGNITIVE IMPAIRMENT: ICD-10-CM

## 2023-08-10 DIAGNOSIS — Z12.31 SCREENING MAMMOGRAM FOR BREAST CANCER: ICD-10-CM

## 2023-08-10 DIAGNOSIS — I10 ESSENTIAL HYPERTENSION: Primary | Chronic | ICD-10-CM

## 2023-08-10 PROCEDURE — 99214 PR OFFICE/OUTPT VISIT, EST, LEVL IV, 30-39 MIN: ICD-10-PCS | Mod: S$GLB,,, | Performed by: FAMILY MEDICINE

## 2023-08-10 PROCEDURE — 1101F PR PT FALLS ASSESS DOC 0-1 FALLS W/OUT INJ PAST YR: ICD-10-PCS | Mod: CPTII,S$GLB,, | Performed by: FAMILY MEDICINE

## 2023-08-10 PROCEDURE — 3288F PR FALLS RISK ASSESSMENT DOCUMENTED: ICD-10-PCS | Mod: CPTII,S$GLB,, | Performed by: FAMILY MEDICINE

## 2023-08-10 PROCEDURE — 3288F FALL RISK ASSESSMENT DOCD: CPT | Mod: CPTII,S$GLB,, | Performed by: FAMILY MEDICINE

## 2023-08-10 PROCEDURE — 3075F PR MOST RECENT SYSTOLIC BLOOD PRESS GE 130-139MM HG: ICD-10-PCS | Mod: CPTII,S$GLB,, | Performed by: FAMILY MEDICINE

## 2023-08-10 PROCEDURE — 1126F PR PAIN SEVERITY QUANTIFIED, NO PAIN PRESENT: ICD-10-PCS | Mod: CPTII,S$GLB,, | Performed by: FAMILY MEDICINE

## 2023-08-10 PROCEDURE — 3079F PR MOST RECENT DIASTOLIC BLOOD PRESSURE 80-89 MM HG: ICD-10-PCS | Mod: CPTII,S$GLB,, | Performed by: FAMILY MEDICINE

## 2023-08-10 PROCEDURE — 3079F DIAST BP 80-89 MM HG: CPT | Mod: CPTII,S$GLB,, | Performed by: FAMILY MEDICINE

## 2023-08-10 PROCEDURE — 1126F AMNT PAIN NOTED NONE PRSNT: CPT | Mod: CPTII,S$GLB,, | Performed by: FAMILY MEDICINE

## 2023-08-10 PROCEDURE — 1159F PR MEDICATION LIST DOCUMENTED IN MEDICAL RECORD: ICD-10-PCS | Mod: CPTII,S$GLB,, | Performed by: FAMILY MEDICINE

## 2023-08-10 PROCEDURE — 1159F MED LIST DOCD IN RCRD: CPT | Mod: CPTII,S$GLB,, | Performed by: FAMILY MEDICINE

## 2023-08-10 PROCEDURE — 1160F RVW MEDS BY RX/DR IN RCRD: CPT | Mod: CPTII,S$GLB,, | Performed by: FAMILY MEDICINE

## 2023-08-10 PROCEDURE — 99999 PR PBB SHADOW E&M-EST. PATIENT-LVL III: CPT | Mod: PBBFAC,,, | Performed by: FAMILY MEDICINE

## 2023-08-10 PROCEDURE — 1101F PT FALLS ASSESS-DOCD LE1/YR: CPT | Mod: CPTII,S$GLB,, | Performed by: FAMILY MEDICINE

## 2023-08-10 PROCEDURE — 1160F PR REVIEW ALL MEDS BY PRESCRIBER/CLIN PHARMACIST DOCUMENTED: ICD-10-PCS | Mod: CPTII,S$GLB,, | Performed by: FAMILY MEDICINE

## 2023-08-10 PROCEDURE — 99999 PR PBB SHADOW E&M-EST. PATIENT-LVL III: ICD-10-PCS | Mod: PBBFAC,,, | Performed by: FAMILY MEDICINE

## 2023-08-10 PROCEDURE — 3075F SYST BP GE 130 - 139MM HG: CPT | Mod: CPTII,S$GLB,, | Performed by: FAMILY MEDICINE

## 2023-08-10 PROCEDURE — 99214 OFFICE O/P EST MOD 30 MIN: CPT | Mod: S$GLB,,, | Performed by: FAMILY MEDICINE

## 2023-08-10 NOTE — PROGRESS NOTES
Subjective:       Patient ID: Bernarda Perera is a 78 y.o. female.    Chief Complaint: Establish Care    Ms. Perera presents today to establish care. She rarely goes to the doctor but she was previously seeing someone with [facility].     Hypertension: Well controlled with amlodipine, tenoretic.     Memory issues: She is taking aricept and namenda. She is very forgetful she says.     She is taking aspirin and omega 3s as well.       Review of Systems   Constitutional:  Negative for activity change, appetite change, fatigue and fever.   Respiratory:  Negative for shortness of breath.    Gastrointestinal:  Negative for abdominal pain.   Integumentary:  Negative for rash.         Objective:      Physical Exam  Vitals and nursing note reviewed.   Constitutional:       General: She is not in acute distress.  HENT:      Head: Normocephalic and atraumatic.   Eyes:      Conjunctiva/sclera: Conjunctivae normal.      Pupils: Pupils are equal, round, and reactive to light.   Cardiovascular:      Rate and Rhythm: Normal rate and regular rhythm.      Heart sounds: No murmur heard.  Pulmonary:      Effort: Pulmonary effort is normal. No respiratory distress.      Breath sounds: Normal breath sounds.   Abdominal:      General: Bowel sounds are normal. There is no distension.      Palpations: Abdomen is soft. There is no mass.   Musculoskeletal:         General: Normal range of motion.      Cervical back: Normal range of motion and neck supple.   Skin:     General: Skin is warm and dry.      Capillary Refill: Capillary refill takes less than 2 seconds.      Findings: No rash.   Neurological:      Mental Status: She is alert and oriented to person, place, and time.      Cranial Nerves: No cranial nerve deficit.   Psychiatric:         Thought Content: Thought content normal.         Assessment:       1. Essential hypertension    2. Hyperlipidemia with target LDL less than 100    3. 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 Mild cognitive impairment    5. Screening mammogram for breast cancer        Plan:       Problem List Items Addressed This Visit          Neuro    Mild cognitive impairment     Aricept and Namenda working well. She is awaiting her appt with psychiatry            Cardiac/Vascular    Essential hypertension - Primary (Chronic)     Very well controlled. Amlodipine and Tenoretic working well.          Relevant Orders    Comprehensive Metabolic Panel    Hyperlipidemia with target LDL less than 100 (Chronic)     Last cholesterol elevated. Does not appear to be on a medication now.          Relevant Orders    Lipid Panel       Endocrine    Controlled type 2 diabetes with borderline retinopathy (Chronic)    Relevant Orders    Hemoglobin A1C     Other Visit Diagnoses       Screening mammogram for breast cancer        Relevant Orders    Mammo Digital Screening [facility] w/ [facility]

## 2023-08-21 ENCOUNTER — LAB VISIT (OUTPATIENT)
Dept: LAB | Facility: HOSPITAL | Age: 78
End: 2023-08-21
Attending: FAMILY MEDICINE
Payer: MEDICARE

## 2023-08-21 DIAGNOSIS — I10 ESSENTIAL HYPERTENSION: Chronic | ICD-10-CM

## 2023-08-21 DIAGNOSIS — E78.5 HYPERLIPIDEMIA WITH TARGET LDL LESS THAN 100: Chronic | ICD-10-CM

## 2023-08-21 DIAGNOSIS — E11.319 CONTROLLED TYPE 2 DIABETES MELLITUS WITH RETINOPATHY WITHOUT MACULAR EDEMA, WITHOUT LONG-TERM CURRENT USE OF INSULIN, UNSPECIFIED LATERALITY, UNSPECIFIED RETINOPATHY SEVERITY: Chronic | ICD-10-CM

## 2023-08-21 LAB
ALBUMIN SERPL BCP-MCNC: 3.9 G/DL (ref 3.5–5.2)
ALP SERPL-CCNC: 56 U/L (ref 55–135)
ALT SERPL W/O P-5'-P-CCNC: 16 U/L (ref 10–44)
ANION GAP SERPL CALC-SCNC: 7 MMOL/L (ref 8–16)
AST SERPL-CCNC: 16 U/L (ref 10–40)
BILIRUB SERPL-MCNC: 0.6 MG/DL (ref 0.1–1)
BUN SERPL-MCNC: 17 MG/DL (ref 8–23)
CALCIUM SERPL-MCNC: 9.2 MG/DL (ref 8.7–10.5)
CHLORIDE SERPL-SCNC: 105 MMOL/L (ref 95–110)
CHOLEST SERPL-MCNC: 347 MG/DL (ref 120–199)
CHOLEST/HDLC SERPL: 5 {RATIO} (ref 2–5)
CO2 SERPL-SCNC: 25 MMOL/L (ref 23–29)
CREAT SERPL-MCNC: 0.9 MG/DL (ref 0.5–1.4)
EST. GFR  (NO RACE VARIABLE): >60 ML/MIN/1.73 M^2
ESTIMATED AVG GLUCOSE: 117 MG/DL (ref 68–131)
GLUCOSE SERPL-MCNC: 118 MG/DL (ref 70–110)
HBA1C MFR BLD: 5.7 % (ref 4–5.6)
HDLC SERPL-MCNC: 69 MG/DL (ref 40–75)
HDLC SERPL: 19.9 % (ref 20–50)
LDLC SERPL CALC-MCNC: 253.2 MG/DL (ref 63–159)
NONHDLC SERPL-MCNC: 278 MG/DL
POTASSIUM SERPL-SCNC: 4.1 MMOL/L (ref 3.5–5.1)
PROT SERPL-MCNC: 7.1 G/DL (ref 6–8.4)
SODIUM SERPL-SCNC: 137 MMOL/L (ref 136–145)
TRIGL SERPL-MCNC: 124 MG/DL (ref 30–150)

## 2023-08-21 PROCEDURE — 80061 LIPID PANEL: CPT | Performed by: FAMILY MEDICINE

## 2023-08-21 PROCEDURE — 80053 COMPREHEN METABOLIC PANEL: CPT | Performed by: FAMILY MEDICINE

## 2023-08-21 PROCEDURE — 83036 HEMOGLOBIN GLYCOSYLATED A1C: CPT | Performed by: FAMILY MEDICINE

## 2023-08-21 PROCEDURE — 36415 COLL VENOUS BLD VENIPUNCTURE: CPT | Performed by: FAMILY MEDICINE

## 2023-09-07 ENCOUNTER — TELEPHONE (OUTPATIENT)
Dept: FAMILY MEDICINE | Facility: CLINIC | Age: 78
End: 2023-09-07
Payer: MEDICARE

## 2023-09-07 DIAGNOSIS — I10 ESSENTIAL HYPERTENSION: ICD-10-CM

## 2023-09-07 RX ORDER — AMLODIPINE BESYLATE 5 MG/1
5 TABLET ORAL DAILY
Qty: 90 TABLET | Refills: 1 | Status: SHIPPED | OUTPATIENT
Start: 2023-09-07 | End: 2024-01-11 | Stop reason: SDUPTHER

## 2023-09-07 RX ORDER — ATENOLOL AND CHLORTHALIDONE TABLET 50; 25 MG/1; MG/1
1 TABLET ORAL DAILY
Qty: 90 TABLET | Refills: 1 | Status: SHIPPED | OUTPATIENT
Start: 2023-09-07 | End: 2024-01-11 | Stop reason: SDUPTHER

## 2023-09-07 NOTE — TELEPHONE ENCOUNTER
----- Message from Adam Cha sent at 9/7/2023 12:01 PM CDT -----  Contact: pt  Type: Needs Medical Advice  Who Called:  pt     Pharmacy name and phone #:        [id number] - [place], [place] - [address]  [address] [place] [address]  Phone: [phone] Fax: [phone]        Best Call Back Number: [phone]    Additional Information: pt would like a refill on her medications expect memantine (NAMENDA) 10 MG Tab. Please advise.

## 2023-09-07 NOTE — TELEPHONE ENCOUNTER
----- Message from Jose E Barkley sent at 9/7/2023 12:27 PM CDT -----  Contact: self  Type:  RX Refill Request    Who Called:  pt  Refill or New Rx:  refill  RX Name and Strength:  atenoloL-chlorthalidone (TENORETIC) 50-25 mg Tab  amLODIPine (NORVASC) 5 MG tablet  How is the patient currently taking it? (ex. 1XDay):   TAKE 1 TABLET BY MOUTH EVERY DAY  Is this a 30 day or 90 day RX:  90  Preferred Pharmacy with phone number:    [id number] - [place], [place] - [address]  [address] [place] [address]  Phone: [phone] Fax: [phone]  Local or Mail Order:  local  Ordering Provider:  n/a  Best Call Back Number:  [phone]   Additional Information:  Pt states the rx are not at the pharmacy  Please advise  Thank you

## 2023-09-07 NOTE — TELEPHONE ENCOUNTER
----- Message from Jose E Barkley sent at 9/7/2023 12:27 PM CDT -----  Contact: self  Type:  RX Refill Request    Who Called:  pt  Refill or New Rx:  refill  RX Name and Strength:  atenoloL-chlorthalidone (TENORETIC) 50-25 mg Tab  amLODIPine (NORVASC) 5 MG tablet  How is the patient currently taking it? (ex. 1XDay):   TAKE 1 TABLET BY MOUTH EVERY DAY  Is this a 30 day or 90 day RX:  90  Preferred Pharmacy with phone number:    [facility]/pharmacy #[id number] - [place], [place] - [address]  [address] [address]  Phone: [phone] Fax: [phone]  Local or Mail Order:  local  Ordering Provider:  n/a  Best Call Back Number:  [phone]   Additional Information:  Pt states the rx are not at the pharmacy  Please advise  Thank you

## 2023-09-28 ENCOUNTER — HOSPITAL ENCOUNTER (OUTPATIENT)
Dept: RADIOLOGY | Facility: HOSPITAL | Age: 78
Discharge: HOME | End: 2023-09-28
Attending: FAMILY MEDICINE
Payer: MEDICARE

## 2023-09-28 DIAGNOSIS — Z12.31 SCREENING MAMMOGRAM FOR BREAST CANCER: ICD-10-CM

## 2023-09-28 PROCEDURE — 77063 BREAST TOMOSYNTHESIS BI: CPT | Mod: 26,,, | Performed by: RADIOLOGY

## 2023-09-28 PROCEDURE — 77067 MAMMO DIGITAL SCREENING BILAT WITH TOMO: ICD-10-PCS | Mod: 26,,, | Performed by: RADIOLOGY

## 2023-09-28 PROCEDURE — 77067 SCR MAMMO BI INCL CAD: CPT | Mod: TC

## 2023-09-28 PROCEDURE — 77063 MAMMO DIGITAL SCREENING BILAT WITH TOMO: ICD-10-PCS | Mod: 26,,, | Performed by: RADIOLOGY

## 2023-09-28 PROCEDURE — 77067 SCR MAMMO BI INCL CAD: CPT | Mod: 26,,, | Performed by: RADIOLOGY

## 2023-12-13 DIAGNOSIS — Z78.0 MENOPAUSE: ICD-10-CM

## 2024-01-11 ENCOUNTER — OFFICE VISIT (OUTPATIENT)
Dept: FAMILY MEDICINE | Facility: CLINIC | Age: 79
End: 2024-01-11
Payer: MEDICARE

## 2024-01-11 DIAGNOSIS — L29.9 ITCHING: ICD-10-CM

## 2024-01-11 DIAGNOSIS — R41.3 MEMORY LOSS: ICD-10-CM

## 2024-01-11 DIAGNOSIS — R21 RASH: Primary | ICD-10-CM

## 2024-01-11 DIAGNOSIS — I10 ESSENTIAL HYPERTENSION: ICD-10-CM

## 2024-01-11 PROCEDURE — 1101F PT FALLS ASSESS-DOCD LE1/YR: CPT | Mod: CPTII,S$GLB,, | Performed by: FAMILY MEDICINE

## 2024-01-11 PROCEDURE — 99213 OFFICE O/P EST LOW 20 MIN: CPT | Mod: S$GLB,,, | Performed by: FAMILY MEDICINE

## 2024-01-11 PROCEDURE — 3288F FALL RISK ASSESSMENT DOCD: CPT | Mod: CPTII,S$GLB,, | Performed by: FAMILY MEDICINE

## 2024-01-11 PROCEDURE — 3078F DIAST BP <80 MM HG: CPT | Mod: CPTII,S$GLB,, | Performed by: FAMILY MEDICINE

## 2024-01-11 PROCEDURE — 3074F SYST BP LT 130 MM HG: CPT | Mod: CPTII,S$GLB,, | Performed by: FAMILY MEDICINE

## 2024-01-11 PROCEDURE — 1159F MED LIST DOCD IN RCRD: CPT | Mod: CPTII,S$GLB,, | Performed by: FAMILY MEDICINE

## 2024-01-11 PROCEDURE — 99999 PR PBB SHADOW E&M-EST. PATIENT-LVL III: CPT | Mod: PBBFAC,,, | Performed by: FAMILY MEDICINE

## 2024-01-11 RX ORDER — DONEPEZIL HYDROCHLORIDE 5 MG/1
5 TABLET, FILM COATED ORAL NIGHTLY
Qty: 90 TABLET | Refills: 3 | Status: SHIPPED | OUTPATIENT
Start: 2024-01-11 | End: 2024-05-09

## 2024-01-11 RX ORDER — ATENOLOL AND CHLORTHALIDONE TABLET 50; 25 MG/1; MG/1
1 TABLET ORAL DAILY
Qty: 90 TABLET | Refills: 1 | Status: SHIPPED | OUTPATIENT
Start: 2024-01-11

## 2024-01-11 RX ORDER — AMLODIPINE BESYLATE 5 MG/1
5 TABLET ORAL DAILY
Qty: 90 TABLET | Refills: 1 | Status: SHIPPED | OUTPATIENT
Start: 2024-01-11

## 2024-01-11 RX ORDER — TRIAMCINOLONE ACETONIDE 1 MG/G
CREAM TOPICAL 2 TIMES DAILY
Qty: 45 G | Refills: 0 | Status: SHIPPED | OUTPATIENT
Start: 2024-01-11

## 2024-01-11 RX ORDER — MEMANTINE HYDROCHLORIDE 10 MG/1
10 TABLET ORAL 2 TIMES DAILY
Qty: 180 TABLET | Refills: 3 | Status: SHIPPED | OUTPATIENT
Start: 2024-01-11

## 2024-01-11 NOTE — PROGRESS NOTES
Subjective:       Patient ID: Bernarda Perera is a 78 y.o. female.    Chief Complaint: Memory Loss    Presents today for her regular follow up and wants refills on medications.     Also needs to see dermatology for a rash.        Blood pressure is well controlled.       Review of Systems   Constitutional:  Negative for activity change, appetite change, fatigue and fever.   Respiratory:  Negative for shortness of breath.    Gastrointestinal:  Negative for abdominal pain.   Integumentary:  Negative for rash.   Psychiatric/Behavioral:          Chronic memory issues         Objective:      Physical Exam  Vitals and nursing note reviewed.   Constitutional:       General: She is not in acute distress.     Appearance: Normal appearance. She is well-developed. She is not diaphoretic.   HENT:      Head: Normocephalic and atraumatic.      Right Ear: External ear normal.      Left Ear: External ear normal.   Eyes:      General:         Right eye: No discharge.         Left eye: No discharge.   Cardiovascular:      Rate and Rhythm: Normal rate and regular rhythm.      Heart sounds: Normal heart sounds.   Pulmonary:      Effort: Pulmonary effort is normal.      Breath sounds: Normal breath sounds. No wheezing or rales.   Skin:     General: Skin is warm and dry.      Findings: Rash present.   Neurological:      Mental Status: She is alert and oriented to person, place, and time. Mental status is at baseline.   Psychiatric:         Mood and Affect: Mood normal.         Behavior: Behavior normal.         Thought Content: Thought content normal.         Judgment: Judgment normal.         Assessment:       1. Rash    2. Essential hypertension    3. Memory loss    4. Itching        Plan:       Problem List Items Addressed This Visit          Cardiac/Vascular    Essential hypertension (Chronic)    Relevant Medications    amLODIPine (NORVASC) 5 MG tablet    atenoloL-chlorthalidone (TENORETIC) 50-25 mg Tab     Other Visit Diagnoses        Rash    -  Primary    Relevant Medications    triamcinolone acetonide 0.1% (KENALOG) 0.1 % cream    Other Relevant Orders    Ambulatory referral/consult to Dermatology    Memory loss        Relevant Medications    donepeziL (ARICEPT) 5 MG tablet    memantine (NAMENDA) 10 MG Tab    Itching        Relevant Orders    Ambulatory referral/consult to Dermatology            Labs, orders and refills as above.

## 2024-01-18 VITALS
DIASTOLIC BLOOD PRESSURE: 60 MMHG | SYSTOLIC BLOOD PRESSURE: 120 MMHG | HEIGHT: 64 IN | WEIGHT: 166 LBS | BODY MASS INDEX: 28.34 KG/M2

## 2024-04-05 ENCOUNTER — OFFICE VISIT (OUTPATIENT)
Dept: FAMILY MEDICINE | Facility: CLINIC | Age: 79
End: 2024-04-05
Payer: MEDICARE

## 2024-04-05 ENCOUNTER — HOSPITAL ENCOUNTER (OUTPATIENT)
Dept: RADIOLOGY | Facility: HOSPITAL | Age: 79
Discharge: HOME | End: 2024-04-05
Attending: FAMILY MEDICINE
Payer: MEDICARE

## 2024-04-05 ENCOUNTER — TELEPHONE (OUTPATIENT)
Dept: FAMILY MEDICINE | Facility: CLINIC | Age: 79
End: 2024-04-05
Payer: MEDICARE

## 2024-04-05 VITALS
SYSTOLIC BLOOD PRESSURE: 138 MMHG | DIASTOLIC BLOOD PRESSURE: 60 MMHG | OXYGEN SATURATION: 98 % | HEART RATE: 84 BPM | TEMPERATURE: 98 F | WEIGHT: 169.5 LBS | HEIGHT: 64 IN | BODY MASS INDEX: 28.94 KG/M2 | RESPIRATION RATE: 15 BRPM

## 2024-04-05 DIAGNOSIS — G30.8 OTHER ALZHEIMER'S DISEASE (CODE): ICD-10-CM

## 2024-04-05 DIAGNOSIS — S09.92XA NASAL TRAUMA, INITIAL ENCOUNTER: Primary | ICD-10-CM

## 2024-04-05 DIAGNOSIS — M25.571 ACUTE RIGHT ANKLE PAIN: ICD-10-CM

## 2024-04-05 DIAGNOSIS — I70.0 ATHEROSCLEROSIS OF AORTA: ICD-10-CM

## 2024-04-05 DIAGNOSIS — S09.92XA NASAL TRAUMA, INITIAL ENCOUNTER: ICD-10-CM

## 2024-04-05 DIAGNOSIS — E11.319 CONTROLLED TYPE 2 DIABETES MELLITUS WITH RETINOPATHY WITHOUT MACULAR EDEMA, WITHOUT LONG-TERM CURRENT USE OF INSULIN, UNSPECIFIED LATERALITY, UNSPECIFIED RETINOPATHY SEVERITY: ICD-10-CM

## 2024-04-05 PROCEDURE — 99214 OFFICE O/P EST MOD 30 MIN: CPT | Mod: PBBFAC,25 | Performed by: FAMILY MEDICINE

## 2024-04-05 PROCEDURE — 70160 X-RAY EXAM OF NASAL BONES: CPT | Mod: 26,,, | Performed by: RADIOLOGY

## 2024-04-05 PROCEDURE — 90715 TDAP VACCINE 7 YRS/> IM: CPT | Mod: S$GLB,,, | Performed by: FAMILY MEDICINE

## 2024-04-05 PROCEDURE — 90471 IMMUNIZATION ADMIN: CPT | Mod: S$GLB,,, | Performed by: FAMILY MEDICINE

## 2024-04-05 PROCEDURE — 70160 X-RAY EXAM OF NASAL BONES: CPT | Mod: TC

## 2024-04-05 PROCEDURE — 99999 PR PBB SHADOW E&M-EST. PATIENT-LVL IV: CPT | Mod: PBBFAC,,, | Performed by: FAMILY MEDICINE

## 2024-04-05 PROCEDURE — 99214 OFFICE O/P EST MOD 30 MIN: CPT | Mod: 25,S$GLB,, | Performed by: FAMILY MEDICINE

## 2024-04-05 PROCEDURE — 73610 X-RAY EXAM OF ANKLE: CPT | Mod: TC,RT

## 2024-04-05 PROCEDURE — 73610 X-RAY EXAM OF ANKLE: CPT | Mod: 26,RT,S$GLB, | Performed by: RADIOLOGY
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
## 2024-05-07 ENCOUNTER — NURSE TRIAGE (OUTPATIENT)
Dept: ADMINISTRATIVE | Facility: CLINIC | Age: 79
End: 2024-05-07
Payer: MEDICARE

## 2024-05-07 NOTE — TELEPHONE ENCOUNTER
This mentions that she has had severe headaches before and has not tried anything for this headache. If this is similar to prior headaches- I recommend trialing OTC treatment of choice (tylenol, ibuprofen or naproxen), hot or cold compress and rest. If this does not improve headache or this headache has new or unusually features recommend urgent care or emergent eval. -Dr. Galdamez

## 2024-05-07 NOTE — TELEPHONE ENCOUNTER
Transferred from scheduling after no available appt.  Right side head pain. Pain began this morning. States has hx of headaches but has not had one in awhile. Denies hx migraines. States she has not taken anything for pain. Rates pain 9/10. Care advice per protocol. Advised would route to provider asking for outreach with care directives per protocol. Advised if does not hear back in 1 hour, consider going to ED or  for eval. Advised to monitor and to call back with concerns or worsening symptoms. Verbalized understanding.   Reason for Disposition   SEVERE headache (e.g., excruciating) and has had severe headaches before    Additional Information   Negative: Difficult to awaken or acting confused (e.g., disoriented, slurred speech)   Negative: Weakness of the face, arm or leg on one side of the body and new-onset   Negative: Numbness of the face, arm or leg on one side of the body and new-onset   Negative: Loss of speech or garbled speech and new-onset   Negative: Passed out (i.e., fainted, collapsed and was not responding)   Negative: Sounds like a life-threatening emergency to the triager   Negative: Unable to walk without falling   Negative: Stiff neck (can't touch chin to chest)   Negative: Possibility of carbon monoxide exposure   Negative: SEVERE headache, states 'worst headache' of life   Negative: SEVERE headache, sudden-onset (i.e., reaching maximum intensity within seconds to 1 hour)   Negative: Severe pain in one eye   Negative: Loss of vision or double vision  (Exception: Same as prior migraines.)   Negative: Patient sounds very sick or weak to the triager   Negative: Fever > 103 F (39.4 C)   Negative: Fever > 100.0 F (37.8 C) and has diabetes mellitus or a weak immune system (e.g., HIV positive, cancer chemotherapy, organ transplant, splenectomy, chronic steroids)    Protocols used: Headache-A-OH

## 2024-05-09 ENCOUNTER — OFFICE VISIT (OUTPATIENT)
Dept: FAMILY MEDICINE | Facility: CLINIC | Age: 79
End: 2024-05-09
Payer: MEDICARE

## 2024-05-09 VITALS
HEIGHT: 64 IN | HEART RATE: 79 BPM | RESPIRATION RATE: 18 BRPM | SYSTOLIC BLOOD PRESSURE: 138 MMHG | BODY MASS INDEX: 28.6 KG/M2 | DIASTOLIC BLOOD PRESSURE: 70 MMHG | OXYGEN SATURATION: 97 % | WEIGHT: 167.5 LBS

## 2024-05-09 DIAGNOSIS — R41.3 MEMORY LOSS: ICD-10-CM

## 2024-05-09 DIAGNOSIS — G44.201 ACUTE INTRACTABLE TENSION-TYPE HEADACHE: ICD-10-CM

## 2024-05-09 DIAGNOSIS — E78.5 HYPERLIPIDEMIA WITH TARGET LDL LESS THAN 100: Chronic | ICD-10-CM

## 2024-05-09 DIAGNOSIS — I10 ESSENTIAL HYPERTENSION: Chronic | ICD-10-CM

## 2024-05-09 DIAGNOSIS — E11.319 CONTROLLED TYPE 2 DIABETES MELLITUS WITH RETINOPATHY WITHOUT MACULAR EDEMA, WITHOUT LONG-TERM CURRENT USE OF INSULIN, UNSPECIFIED LATERALITY, UNSPECIFIED RETINOPATHY SEVERITY: Primary | Chronic | ICD-10-CM

## 2024-05-09 PROCEDURE — 1159F MED LIST DOCD IN RCRD: CPT | Mod: CPTII,S$GLB,, | Performed by: FAMILY MEDICINE

## 2024-05-09 PROCEDURE — 3288F FALL RISK ASSESSMENT DOCD: CPT | Mod: CPTII,S$GLB,, | Performed by: FAMILY MEDICINE

## 2024-05-09 PROCEDURE — 1101F PT FALLS ASSESS-DOCD LE1/YR: CPT | Mod: CPTII,S$GLB,, | Performed by: FAMILY MEDICINE

## 2024-05-09 PROCEDURE — 3075F SYST BP GE 130 - 139MM HG: CPT | Mod: CPTII,S$GLB,, | Performed by: FAMILY MEDICINE

## 2024-05-09 PROCEDURE — 99214 OFFICE O/P EST MOD 30 MIN: CPT | Mod: S$GLB,,, | Performed by: FAMILY MEDICINE

## 2024-05-09 PROCEDURE — 1160F RVW MEDS BY RX/DR IN RCRD: CPT | Mod: CPTII,S$GLB,, | Performed by: FAMILY MEDICINE

## 2024-05-09 PROCEDURE — 3078F DIAST BP <80 MM HG: CPT | Mod: CPTII,S$GLB,, | Performed by: FAMILY MEDICINE

## 2024-05-09 PROCEDURE — 1125F AMNT PAIN NOTED PAIN PRSNT: CPT | Mod: CPTII,S$GLB,, | Performed by: FAMILY MEDICINE

## 2024-05-09 PROCEDURE — 99999 PR PBB SHADOW E&M-EST. PATIENT-LVL IV: CPT | Mod: PBBFAC,,, | Performed by: FAMILY MEDICINE

## 2024-05-09 RX ORDER — ROSUVASTATIN CALCIUM 10 MG/1
10 TABLET, COATED ORAL DAILY
Qty: 90 TABLET | Refills: 3 | Status: SHIPPED | OUTPATIENT
Start: 2024-05-09 | End: 2025-05-09

## 2024-05-09 RX ORDER — NAPROXEN 500 MG/1
500 TABLET ORAL 2 TIMES DAILY PRN
Qty: 30 TABLET | Refills: 1 | Status: SHIPPED | OUTPATIENT
Start: 2024-05-09

## 2024-05-09 RX ORDER — ROSUVASTATIN CALCIUM 10 MG/1
10 TABLET, COATED ORAL DAILY
Qty: 90 TABLET | Refills: 3 | Status: SHIPPED | OUTPATIENT
Start: 2024-05-09 | End: 2024-05-09

## 2024-05-09 RX ORDER — DONEPEZIL HYDROCHLORIDE 10 MG/1
10 TABLET, FILM COATED ORAL NIGHTLY
Qty: 90 TABLET | Refills: 3 | Status: SHIPPED | OUTPATIENT
Start: 2024-05-09 | End: 2024-05-09

## 2024-05-09 RX ORDER — DONEPEZIL HYDROCHLORIDE 10 MG/1
10 TABLET, FILM COATED ORAL NIGHTLY
Qty: 90 TABLET | Refills: 3 | Status: SHIPPED | OUTPATIENT
Start: 2024-05-09 | End: 2025-05-09

## 2024-05-09 NOTE — PROGRESS NOTES
Subjective:       Patient ID: Bernarda Perera is a 79 y.o. female.    Chief Complaint: Tension Headache (Lasting a few months)    Long term headache sufferer.     For the last week she is complaining of a tension headache.     Her  states that she was complaining the other day of pain in the right temple. She described the pain as shooting pain.     Her memory is getting worse.         .  Patient Active Problem List   Diagnosis    Essential hypertension    Hyperlipidemia with target LDL less than 100    Obesity    Anemia    Syncope and collapse    Mild diastolic dysfunction    Controlled type 2 diabetes with borderline retinopathy    SOB (shortness of breath)    Palpitations    Atherosclerosis of aorta    Mild cognitive impairment    Other Alzheimer's disease (CODE)     Bernarda has a current medication list which includes the following prescription(s): acetaminophen, amlodipine, aspirin, memantine, omega-3 fatty acids, atenolol-chlorthalidone, donepezil, naproxen, rosuvastatin, and triamcinolone acetonide 0.1%.    Review of Systems   Constitutional:  Negative for activity change, appetite change, fatigue and fever.   Respiratory:  Negative for shortness of breath.    Gastrointestinal:  Negative for abdominal pain.   Integumentary:  Negative for rash.         Health Maintenance Due   Topic Date Due    RSV Vaccine (Age 60+ and Pregnant patients) (1 - 1-dose 60+ series) Never done    Shingles Vaccine (2 of 3) 08/18/2015    Eye Exam  12/28/2016    Diabetes Urine Screening  03/16/2023    COVID-19 Vaccine (4 - 2023-24 season) 09/01/2023    DEXA Scan  11/11/2023    Hemoglobin A1c  02/21/2024      Health Maintenance reviewed and discussed- Discussed getting eye exam. Discussed scheduling dexa scan.   Objective:      Physical Exam  Vitals and nursing note reviewed.   Constitutional:       General: She is not in acute distress.     Appearance: She is not ill-appearing.   Cardiovascular:      Rate and Rhythm: Normal rate  and regular rhythm.      Heart sounds: No murmur heard.  Pulmonary:      Effort: Pulmonary effort is normal.      Breath sounds: Normal breath sounds. No wheezing.   Skin:     General: Skin is warm and dry.      Findings: No rash.   Neurological:      Mental Status: She is alert.   Psychiatric:         Mood and Affect: Mood normal.         Behavior: Behavior normal.         Assessment:       1. 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 Memory loss    3. Hyperlipidemia with target LDL less than 100    4. Essential hypertension    5. Acute intractable tension-type headache        Plan:       1. Controlled type 2 diabetes mellitus with retinopathy without macular edema, without long-term current use of insulin, unspecified laterality, unspecified retinopathy severity  Overview:  diet controlled    Orders:  -     Hemoglobin A1C; Future; Expected date: 05/09/2024    2. Memory loss  -     Discontinue: donepeziL (ARICEPT) 10 MG tablet; Take 1 tablet (10 mg total) by mouth every evening.  Dispense: 90 tablet; Refill: 3  -     donepeziL (ARICEPT) 10 MG tablet; Take 1 tablet (10 mg total) by mouth every evening.  Dispense: 90 tablet; Refill: 3    3. Hyperlipidemia with target LDL less than 100  -     Discontinue: rosuvastatin (CRESTOR) 10 MG tablet; Take 1 tablet (10 mg total) by mouth once daily.  Dispense: 90 tablet; Refill: 3  -     rosuvastatin (CRESTOR) 10 MG tablet; Take 1 tablet (10 mg total) by mouth once daily.  Dispense: 90 tablet; Refill: 3    4. Essential hypertension  Assessment & Plan:  Stable. Well controlled. Continue current medications.         5. Acute intractable tension-type headache  -     naproxen (NAPROSYN) 500 MG tablet; Take 1 tablet (500 mg total) by mouth 2 (two) times daily as needed (headache).  Dispense: 30 tablet; Refill: 1

## 2024-06-12 ENCOUNTER — HOSPITAL ENCOUNTER (OUTPATIENT)
Dept: RADIOLOGY | Facility: HOSPITAL | Age: 79
Discharge: HOME | End: 2024-06-12
Attending: FAMILY MEDICINE
Payer: MEDICARE

## 2024-06-12 ENCOUNTER — OFFICE VISIT (OUTPATIENT)
Dept: FAMILY MEDICINE | Facility: CLINIC | Age: 79
End: 2024-06-12
Payer: MEDICARE

## 2024-06-12 VITALS
SYSTOLIC BLOOD PRESSURE: 132 MMHG | RESPIRATION RATE: 18 BRPM | DIASTOLIC BLOOD PRESSURE: 76 MMHG | OXYGEN SATURATION: 98 % | HEART RATE: 62 BPM | HEIGHT: 64 IN | BODY MASS INDEX: 28.51 KG/M2 | WEIGHT: 167 LBS

## 2024-06-12 DIAGNOSIS — E11.319 CONTROLLED TYPE 2 DIABETES MELLITUS WITH RETINOPATHY WITHOUT MACULAR EDEMA, WITHOUT LONG-TERM CURRENT USE OF INSULIN, UNSPECIFIED LATERALITY, UNSPECIFIED RETINOPATHY SEVERITY: Primary | Chronic | ICD-10-CM

## 2024-06-12 DIAGNOSIS — I70.0 ATHEROSCLEROSIS OF AORTA: Chronic | ICD-10-CM

## 2024-06-12 DIAGNOSIS — M25.531 RIGHT WRIST PAIN: ICD-10-CM

## 2024-06-12 DIAGNOSIS — G30.8 OTHER ALZHEIMER'S DISEASE (CODE): ICD-10-CM

## 2024-06-12 LAB
ALBUMIN/CREAT UR: 13 UG/MG (ref 0–30)
CREAT UR-MCNC: 146 MG/DL (ref 15–325)
MICROALBUMIN UR DL<=1MG/L-MCNC: 19 UG/ML

## 2024-06-12 PROCEDURE — 82043 UR ALBUMIN QUANTITATIVE: CPT | Performed by: FAMILY MEDICINE

## 2024-06-12 PROCEDURE — 3075F SYST BP GE 130 - 139MM HG: CPT | Mod: CPTII,S$GLB,, | Performed by: FAMILY MEDICINE

## 2024-06-12 PROCEDURE — 99999 PR PBB SHADOW E&M-EST. PATIENT-LVL IV: CPT | Mod: PBBFAC,,, | Performed by: FAMILY MEDICINE

## 2024-06-12 PROCEDURE — 73110 X-RAY EXAM OF WRIST: CPT | Mod: 26,RT,S$GLB, | Performed by: RADIOLOGY

## 2024-06-12 PROCEDURE — 73110 X-RAY EXAM OF WRIST: CPT | Mod: TC,PN,RT

## 2024-06-12 PROCEDURE — 3288F FALL RISK ASSESSMENT DOCD: CPT | Mod: CPTII,S$GLB,, | Performed by: FAMILY MEDICINE

## 2024-06-12 PROCEDURE — 1101F PT FALLS ASSESS-DOCD LE1/YR: CPT | Mod: CPTII,S$GLB,, | Performed by: FAMILY MEDICINE

## 2024-06-12 PROCEDURE — 1159F MED LIST DOCD IN RCRD: CPT | Mod: CPTII,S$GLB,, | Performed by: FAMILY MEDICINE

## 2024-06-12 PROCEDURE — 1160F RVW MEDS BY RX/DR IN RCRD: CPT | Mod: CPTII,S$GLB,, | Performed by: FAMILY MEDICINE

## 2024-06-12 PROCEDURE — 3078F DIAST BP <80 MM HG: CPT | Mod: CPTII,S$GLB,, | Performed by: FAMILY MEDICINE

## 2024-06-12 PROCEDURE — 1125F AMNT PAIN NOTED PAIN PRSNT: CPT | Mod: CPTII,S$GLB,, | Performed by: FAMILY MEDICINE

## 2024-06-12 PROCEDURE — 99213 OFFICE O/P EST LOW 20 MIN: CPT | Mod: S$GLB,,, | Performed by: FAMILY MEDICINE

## 2024-06-12 NOTE — PROGRESS NOTES
Subjective:       Patient ID: Bernarda Perera is a 79 y.o. female.    Chief Complaint: Follow-up (Hurt wrist)    Headaches resolved. She is doing better.     Right wrist pain. Denies fall. Has dorsal wrist pain. Spouse states that it is possible she could have fallen and does not remember.     Due for diabetes labs.         .  Patient Active Problem List   Diagnosis    Essential hypertension    Hyperlipidemia with target LDL less than 100    Anemia    Syncope and collapse    Mild diastolic dysfunction    Controlled type 2 diabetes with borderline retinopathy    SOB (shortness of breath)    Palpitations    Atherosclerosis of aorta    Mild cognitive impairment    Other Alzheimer's disease (CODE)     Bernarda has a current medication list which includes the following prescription(s): acetaminophen, amlodipine, aspirin, donepezil, memantine, naproxen, omega-3 fatty acids, rosuvastatin, atenolol-chlorthalidone, and triamcinolone acetonide 0.1%.    Review of Systems   Constitutional:  Negative for activity change, appetite change, fatigue and fever.   Respiratory:  Negative for shortness of breath.    Gastrointestinal:  Negative for abdominal pain.   Integumentary:  Negative for rash.         Health Maintenance Due   Topic Date Due    RSV Vaccine (Age 60+ and Pregnant patients) (1 - 1-dose 60+ series) Never done    Shingles Vaccine (2 of 3) 08/18/2015    Eye Exam  12/28/2016    Diabetes Urine Screening  03/16/2023    COVID-19 Vaccine (4 - 2023-24 season) 09/01/2023    DEXA Scan  11/11/2023    Hemoglobin A1c  02/21/2024      Health Maintenance reviewed and discussed- Due for labs.   Objective:      Physical Exam  Vitals and nursing note reviewed.   Constitutional:       General: She is not in acute distress.     Appearance: She is not ill-appearing.   Cardiovascular:      Rate and Rhythm: Normal rate and regular rhythm.      Heart sounds: No murmur heard.  Pulmonary:      Effort: Pulmonary effort is normal.      Breath sounds:  Normal breath sounds. No wheezing.   Skin:     General: Skin is warm and dry.      Findings: No rash.   Neurological:      Mental Status: She is alert.   Psychiatric:         Mood and Affect: Mood normal.         Behavior: Behavior normal.         Assessment:       1. 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 Other Alzheimer's disease (CODE)    3. Atherosclerosis of aorta    4. Right wrist pain        Plan:       1. Controlled type 2 diabetes mellitus with retinopathy without macular edema, without long-term current use of insulin, unspecified laterality, unspecified retinopathy severity  Overview:  diet controlled    Assessment & Plan:  Has been diet controlled. Due for A1C check      Orders:  -     Microalbumin/Creatinine Ratio, Urine; Future; Expected date: 06/12/2024    2. Other Alzheimer's disease (CODE)  Assessment & Plan:  Stable. Well controlled. Continue current medications.         3. Atherosclerosis of aorta  Assessment & Plan:  Continue statin.       4. Right wrist pain  -     X-Ray Wrist Complete Right; Future; Expected date: 06/12/2024

## 2024-06-13 ENCOUNTER — TELEPHONE (OUTPATIENT)
Dept: FAMILY MEDICINE | Facility: CLINIC | Age: 79
End: 2024-06-13
Payer: MEDICARE

## 2024-06-13 ENCOUNTER — LAB VISIT (OUTPATIENT)
Dept: LAB | Facility: HOSPITAL | Age: 79
End: 2024-06-13
Attending: FAMILY MEDICINE
Payer: MEDICARE

## 2024-06-13 DIAGNOSIS — E11.319 CONTROLLED TYPE 2 DIABETES MELLITUS WITH RETINOPATHY WITHOUT MACULAR EDEMA, WITHOUT LONG-TERM CURRENT USE OF INSULIN, UNSPECIFIED LATERALITY, UNSPECIFIED RETINOPATHY SEVERITY: Chronic | ICD-10-CM

## 2024-06-13 DIAGNOSIS — S52.611D CLOSED DISPLACED FRACTURE OF STYLOID PROCESS OF RIGHT ULNA WITH ROUTINE HEALING, SUBSEQUENT ENCOUNTER: ICD-10-CM

## 2024-06-13 DIAGNOSIS — M25.531 RIGHT WRIST PAIN: Primary | ICD-10-CM

## 2024-06-13 DIAGNOSIS — S52.501A CLOSED FRACTURE OF DISTAL END OF RIGHT RADIUS, UNSPECIFIED FRACTURE MORPHOLOGY, INITIAL ENCOUNTER: ICD-10-CM

## 2024-06-13 LAB
ESTIMATED AVG GLUCOSE: 123 MG/DL (ref 68–131)
HBA1C MFR BLD: 5.9 % (ref 4–5.6)

## 2024-06-13 PROCEDURE — 36415 COLL VENOUS BLD VENIPUNCTURE: CPT | Performed by: FAMILY MEDICINE

## 2024-06-13 PROCEDURE — 83036 HEMOGLOBIN GLYCOSYLATED A1C: CPT | Performed by: FAMILY MEDICINE

## 2024-06-13 NOTE — TELEPHONE ENCOUNTER
----- Message from Jennifer Noah sent at 6/13/2024  2:03 PM CDT -----  Contact: PT  Type:  Test Results    Who Called: PT  Name of Test (Lab/Mammo/Etc): X RAY   Date of Test: 6/12/24  Ordering Provider: MIGUEL  Where the test was performed: [facility]  Would the patient rather a call back or a response via [facility]? CALL  Best Call Back Number: [phone] (home)   Additional Information:  THANK YOU

## 2024-06-17 ENCOUNTER — OFFICE VISIT (OUTPATIENT)
Dept: ORTHOPEDICS | Facility: CLINIC | Age: 79
End: 2024-06-17
Payer: MEDICARE

## 2024-06-17 VITALS
OXYGEN SATURATION: 99 % | WEIGHT: 167.56 LBS | HEIGHT: 64 IN | RESPIRATION RATE: 18 BRPM | BODY MASS INDEX: 28.6 KG/M2 | HEART RATE: 68 BPM

## 2024-06-17 DIAGNOSIS — S52.501A CLOSED FRACTURE OF DISTAL END OF RIGHT RADIUS, UNSPECIFIED FRACTURE MORPHOLOGY, INITIAL ENCOUNTER: Primary | ICD-10-CM

## 2024-06-17 DIAGNOSIS — M25.531 RIGHT WRIST PAIN: ICD-10-CM

## 2024-06-17 DIAGNOSIS — S52.611D CLOSED DISPLACED FRACTURE OF STYLOID PROCESS OF RIGHT ULNA WITH ROUTINE HEALING, SUBSEQUENT ENCOUNTER: ICD-10-CM

## 2024-06-17 DIAGNOSIS — S52.501A CLOSED FRACTURE OF DISTAL END OF RIGHT RADIUS, UNSPECIFIED FRACTURE MORPHOLOGY, INITIAL ENCOUNTER: ICD-10-CM

## 2024-06-17 PROCEDURE — 3288F FALL RISK ASSESSMENT DOCD: CPT | Mod: CPTII,S$GLB,, | Performed by: ORTHOPAEDIC SURGERY

## 2024-06-17 PROCEDURE — 1160F RVW MEDS BY RX/DR IN RCRD: CPT | Mod: CPTII,S$GLB,, | Performed by: ORTHOPAEDIC SURGERY

## 2024-06-17 PROCEDURE — 1159F MED LIST DOCD IN RCRD: CPT | Mod: CPTII,S$GLB,, | Performed by: ORTHOPAEDIC SURGERY

## 2024-06-17 PROCEDURE — 99999 PR PBB SHADOW E&M-EST. PATIENT-LVL IV: CPT | Mod: PBBFAC,,, | Performed by: ORTHOPAEDIC SURGERY

## 2024-06-17 PROCEDURE — 1125F AMNT PAIN NOTED PAIN PRSNT: CPT | Mod: CPTII,S$GLB,, | Performed by: ORTHOPAEDIC SURGERY

## 2024-06-17 PROCEDURE — 99213 OFFICE O/P EST LOW 20 MIN: CPT | Mod: S$GLB,,, | Performed by: ORTHOPAEDIC SURGERY

## 2024-06-17 PROCEDURE — 1101F PT FALLS ASSESS-DOCD LE1/YR: CPT | Mod: CPTII,S$GLB,, | Performed by: ORTHOPAEDIC SURGERY

## 2024-06-17 NOTE — PROGRESS NOTES
Subjective:      Patient ID: Bernarda Perera is a 79 y.o. female.    Chief Complaint: Injury and Pain of the Right Wrist (Pt is unsure when this happened)    HPI  79-year-old female with a several week history of right wrist discomfort.  She is quite active around her house mowing grass etc. states she may have fallen but does not remember the specific incident that resulted in pain.  Was seen by her PCP had x-rays obtained and referred for further evaluation pain is improved with relative rest and a supportive wrap  ROS      Objective:    Ortho Exam     Constitutional:   Patient is alert  and oriented in no acute distress  HEENT:  normocephalic atraumatic; PERRL EOMI  Neck:  Supple without adenopathy  Cardiovascular:  Normal rate and rhythm  Pulmonary:  Normal respiratory effort normal chest wall expansion  Abdominal:  Nonprotuberant nondistended  Musculoskeletal:  Patient has a minimal swelling mild tenderness of the distal radius  Mild limitation of range of motion she has intact skin, sensation, and brisk capillary refill of the digits  Neurological:  No focal defect; cranial nerves 2-12 grossly intact  Psychiatric/behavioral:  Mood and behavior normal      X-Ray Wrist Complete Right  Narrative: EXAMINATION:  XR WRIST COMPLETE 3 VIEWS RIGHT    CLINICAL HISTORY:  Pain in right wrist    TECHNIQUE:  PA, lateral, and oblique views of the right wrist were performed.    COMPARISON:  None.    FINDINGS:  There is a subacute nondisplaced comminuted transverse fracture involving the distal metadiaphysis of the radius.  Fracture lucency extends to the radiocarpal articulation.  There is a subacute nondisplaced comminuted transverse fracture of the ulnar styloid.  There is diffuse soft tissue swelling.    Radiocarpal articulation intact.  Carpal bones intact.  Mild bone demineralization.  Impression: 1. Subacute nondisplaced comminuted transverse articular fracture distal radial metadiaphysis.  2. Subacute nondisplaced  comminuted transverse fracture of the ulnar styloid.  This report was flagged in Epic as abnormal.    Electronically signed by: Papa Koch  Date:    2024  Time:    15:11       My Radiographs Findings:    I have personally reviewed radiographs and concur with above findings    Assessment:       Encounter Diagnoses   Name Primary?    Right wrist pain     Closed fracture of distal end of right radius, unspecified fracture morphology, initial encounter     Closed displaced fracture of styloid process of right ulna with routine healing, subsequent encounter          Plan:       I have discussed medical condition and treatment options with her at length.  We have discussed generalized activity restrictions digit range of motion exercises.  We will place into a removable brace I have discussed repeat radiographs in 3-4 weeks I will see her sooner if any questions or problems.        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, not stated as uncontrolled(250.50)     Type II or unspecified type diabetes mellitus without mention of complication, not stated as uncontrolled     diet controlled    Urinary incontinence     Varicose veins      Past Surgical History:   Procedure Laterality Date    BELT ABDOMINOPLASTY      BREAST CYST EXCISION       SECTION, LOW TRANSVERSE      x1         Current Outpatient Medications:     acetaminophen 325 mg Cap, Take 1 tablet by mouth daily as needed (headaches)., Disp: , Rfl:     amLODIPine (NORVASC) 5 MG tablet, Take 1 tablet (5 mg total) by mouth once daily., Disp: 90 tablet, Rfl: 1    aspirin (ECOTRIN) 81 MG EC tablet, Take 81 mg by mouth  once daily., Disp: , Rfl:     donepeziL (ARICEPT) 10 MG tablet, Take 1 tablet (10 mg total) by mouth every evening., Disp: 90 tablet, Rfl: 3    memantine (NAMENDA) 10 MG Tab, Take 1 tablet (10 mg total) by mouth 2 (two) times daily., Disp: 180 tablet, Rfl: 3    naproxen (NAPROSYN) 500 MG tablet, Take 1 tablet (500 mg total) by mouth 2 (two) times daily as needed (headache)., Disp: 30 tablet, Rfl: 1    omega-3 fatty acids 1,000 mg Cap, Take 1 capsule by mouth Daily. 1 Capsule Oral Every day, Disp: , Rfl:     rosuvastatin (CRESTOR) 10 MG tablet, Take 1 tablet (10 mg total) by mouth once daily., Disp: 90 tablet, Rfl: 3    triamcinolone acetonide 0.1% (KENALOG) 0.1 % cream, Apply topically 2 (two) times daily., Disp: 45 g, Rfl: 0    atenoloL-chlorthalidone (TENORETIC) 50-25 mg Tab, Take 1 tablet by mouth once daily., Disp: 90 tablet, Rfl: 1    Review of patient's allergies indicates:   Allergen Reactions    Ace inhibitors Other (See Comments)     cough    Sulfa (sulfonamide antibiotics) Other (See Comments)     Other reaction(s): Unknown       Family History   Problem Relation Name Age of Onset    Cirrhosis Father          ; he was an alcoholic    Hypertension Unknown      Diabetes type II Unknown      Breast cancer Maternal Aunt       Social History     Occupational History    Occupation:    Tobacco Use    Smoking status: Former     Passive exposure: Past    Smokeless tobacco: Never   Substance and Sexual Activity    Alcohol use: Yes     Comment: once a week    Drug use: Never    Sexual activity: Not Currently     Partners: Male     Birth control/protection: Post-menopausal

## 2024-07-03 ENCOUNTER — HOSPITAL ENCOUNTER (OUTPATIENT)
Dept: RADIOLOGY | Facility: HOSPITAL | Age: 79
Discharge: HOME | End: 2024-07-03
Attending: ORTHOPAEDIC SURGERY
Payer: MEDICARE

## 2024-07-03 ENCOUNTER — OFFICE VISIT (OUTPATIENT)
Dept: ORTHOPEDICS | Facility: CLINIC | Age: 79
End: 2024-07-03
Payer: MEDICARE

## 2024-07-03 DIAGNOSIS — S52.501A CLOSED FRACTURE OF DISTAL END OF RIGHT RADIUS, UNSPECIFIED FRACTURE MORPHOLOGY, INITIAL ENCOUNTER: ICD-10-CM

## 2024-07-03 DIAGNOSIS — S52.501A CLOSED FRACTURE OF DISTAL END OF RIGHT RADIUS, UNSPECIFIED FRACTURE MORPHOLOGY, INITIAL ENCOUNTER: Primary | ICD-10-CM

## 2024-07-03 DIAGNOSIS — S52.501D CLOSED FRACTURE OF DISTAL END OF RIGHT RADIUS WITH ROUTINE HEALING, UNSPECIFIED FRACTURE MORPHOLOGY, SUBSEQUENT ENCOUNTER: Primary | ICD-10-CM

## 2024-07-03 PROCEDURE — 73110 X-RAY EXAM OF WRIST: CPT | Mod: 26,RT,, | Performed by: RADIOLOGY

## 2024-07-03 PROCEDURE — 99999 PR PBB SHADOW E&M-EST. PATIENT-LVL II: CPT | Mod: PBBFAC,,, | Performed by: ORTHOPAEDIC SURGERY

## 2024-07-03 PROCEDURE — 73110 X-RAY EXAM OF WRIST: CPT | Mod: TC,PO,RT

## 2024-07-03 NOTE — PROGRESS NOTES
Subjective:      Patient ID: Bernarda Perera is a 79 y.o. female.    Chief Complaint: Injury of the Right Wrist    HPI  Patient follow up on her right wrist.  She states she is having pain with heavy lifting.  ROS      Objective:    Ortho Exam     Constitutional:   Patient is alert  and oriented in no acute distress  HEENT:  normocephalic atraumatic; PERRL EOMI  Neck:  Supple without adenopathy  Cardiovascular:  Normal rate and rhythm  Pulmonary:  Normal respiratory effort normal chest wall expansion  Abdominal:  Nonprotuberant nondistended  Musculoskeletal:  Patient comes in without her brace  She has minimal swelling mild tenderness to palpation Good digit range of motion  Neurological:  No focal defect; cranial nerves 2-12 grossly intact  Psychiatric/behavioral:  Mood and behavior normal      X-Ray Wrist Complete Right  Narrative: EXAMINATION:  XR WRIST COMPLETE 3 VIEWS RIGHT    CLINICAL HISTORY:  Pain in right wrist    TECHNIQUE:  PA, lateral, and oblique views of the right wrist were performed.    COMPARISON:  None.    FINDINGS:  There is a subacute nondisplaced comminuted transverse fracture involving the distal metadiaphysis of the radius.  Fracture lucency extends to the radiocarpal articulation.  There is a subacute nondisplaced comminuted transverse fracture of the ulnar styloid.  There is diffuse soft tissue swelling.    Radiocarpal articulation intact.  Carpal bones intact.  Mild bone demineralization.  Impression: 1. Subacute nondisplaced comminuted transverse articular fracture distal radial metadiaphysis.  2. Subacute nondisplaced comminuted transverse fracture of the ulnar styloid.  This report was flagged in Epic as abnormal.    Electronically signed by: Papa Koch  Date:    06/12/2024  Time:    15:11       My Radiographs Findings:    Repeat radiographs today show no adverse interval change in fracture alignment  Assessment:       Encounter Diagnosis   Name Primary?    Closed fracture of distal  end of right radius with routine healing, unspecified fracture morphology, subsequent encounter Yes         Plan:       I have encouraged her to get back into a more supportive brace instead of just a simple Ace wrap.  We have discussed generalized activity restrictions and splint wear follow up radiographs in 4-6 weeks sooner if any questions or problems.        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, not stated as uncontrolled(250.50)     Type II or unspecified type diabetes mellitus without mention of complication, not stated as uncontrolled     diet controlled    Urinary incontinence     Varicose veins      Past Surgical History:   Procedure Laterality Date    BELT ABDOMINOPLASTY      BREAST CYST EXCISION       SECTION, LOW TRANSVERSE      x1         Current Outpatient Medications:     acetaminophen 325 mg Cap, Take 1 tablet by mouth daily as needed (headaches)., Disp: , Rfl:     amLODIPine (NORVASC) 5 MG tablet, Take 1 tablet (5 mg total) by mouth once daily., Disp: 90 tablet, Rfl: 1    aspirin (ECOTRIN) 81 MG EC tablet, Take 81 mg by mouth once daily., Disp: , Rfl:     atenoloL-chlorthalidone (TENORETIC) 50-25 mg Tab, Take 1 tablet by mouth once daily., Disp: 90 tablet, Rfl: 1    donepeziL (ARICEPT) 10 MG tablet, Take 1 tablet (10 mg total) by mouth every evening., Disp: 90 tablet, Rfl: 3    memantine (NAMENDA) 10 MG Tab, Take 1 tablet (10 mg total) by mouth 2 (two) times daily., Disp: 180 tablet, Rfl: 3    naproxen (NAPROSYN) 500 MG tablet, Take 1 tablet (500 mg total) by mouth 2 (two) times daily as needed (headache)., Disp: 30 tablet, Rfl: 1    omega-3 fatty  acids 1,000 mg Cap, Take 1 capsule by mouth Daily. 1 Capsule Oral Every day, Disp: , Rfl:     rosuvastatin (CRESTOR) 10 MG tablet, Take 1 tablet (10 mg total) by mouth once daily., Disp: 90 tablet, Rfl: 3    triamcinolone acetonide 0.1% (KENALOG) 0.1 % cream, Apply topically 2 (two) times daily., Disp: 45 g, Rfl: 0    Review of patient's allergies indicates:   Allergen Reactions    Ace inhibitors Other (See Comments)     cough    Sulfa (sulfonamide antibiotics) Other (See Comments)     Other reaction(s): Unknown       Family History   Problem Relation Name Age of Onset    Cirrhosis Father          ; he was an alcoholic    Hypertension Unknown      Diabetes type II Unknown      Breast cancer Maternal Aunt       Social History     Occupational History    Occupation:    Tobacco Use    Smoking status: Former     Passive exposure: Past    Smokeless tobacco: Never   Substance and Sexual Activity    Alcohol use: Yes     Comment: once a week    Drug use: Never    Sexual activity: Not Currently     Partners: Male     Birth control/protection: Post-menopausal

## 2024-08-19 ENCOUNTER — HOSPITAL ENCOUNTER (OUTPATIENT)
Dept: RADIOLOGY | Facility: HOSPITAL | Age: 79
Discharge: HOME | End: 2024-08-19
Attending: ORTHOPAEDIC SURGERY
Payer: MEDICARE

## 2024-08-19 ENCOUNTER — OFFICE VISIT (OUTPATIENT)
Dept: ORTHOPEDICS | Facility: CLINIC | Age: 79
End: 2024-08-19
Payer: MEDICARE

## 2024-08-19 VITALS — HEIGHT: 64 IN | BODY MASS INDEX: 28.6 KG/M2 | WEIGHT: 167.56 LBS

## 2024-08-19 DIAGNOSIS — S52.501D CLOSED FRACTURE OF DISTAL END OF RIGHT RADIUS WITH ROUTINE HEALING, UNSPECIFIED FRACTURE MORPHOLOGY, SUBSEQUENT ENCOUNTER: Primary | ICD-10-CM

## 2024-08-19 DIAGNOSIS — S52.501A CLOSED FRACTURE OF DISTAL END OF RIGHT RADIUS, UNSPECIFIED FRACTURE MORPHOLOGY, INITIAL ENCOUNTER: ICD-10-CM

## 2024-08-19 PROCEDURE — 3288F FALL RISK ASSESSMENT DOCD: CPT | Mod: CPTII,S$GLB,, | Performed by: ORTHOPAEDIC SURGERY

## 2024-08-19 PROCEDURE — 73110 X-RAY EXAM OF WRIST: CPT | Mod: 26,RT,, | Performed by: RADIOLOGY

## 2024-08-19 PROCEDURE — 1100F PTFALLS ASSESS-DOCD GE2>/YR: CPT | Mod: CPTII,S$GLB,, | Performed by: ORTHOPAEDIC SURGERY

## 2024-08-19 PROCEDURE — 73110 X-RAY EXAM OF WRIST: CPT | Mod: TC,PN,RT

## 2024-08-19 PROCEDURE — 1125F AMNT PAIN NOTED PAIN PRSNT: CPT | Mod: CPTII,S$GLB,, | Performed by: ORTHOPAEDIC SURGERY

## 2024-08-19 PROCEDURE — 99999 PR PBB SHADOW E&M-EST. PATIENT-LVL III: CPT | Mod: PBBFAC,,, | Performed by: ORTHOPAEDIC SURGERY

## 2024-08-19 PROCEDURE — 99024 POSTOP FOLLOW-UP VISIT: CPT | Mod: S$GLB,,, | Performed by: ORTHOPAEDIC SURGERY

## 2024-08-19 PROCEDURE — 1159F MED LIST DOCD IN RCRD: CPT | Mod: CPTII,S$GLB,, | Performed by: ORTHOPAEDIC SURGERY

## 2024-08-19 PROCEDURE — 1160F RVW MEDS BY RX/DR IN RCRD: CPT | Mod: CPTII,S$GLB,, | Performed by: ORTHOPAEDIC SURGERY
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
## 2024-09-05 ENCOUNTER — TELEPHONE (OUTPATIENT)
Dept: ORTHOPEDICS | Facility: CLINIC | Age: 79
End: 2024-09-05
Payer: MEDICARE

## 2024-09-05 DIAGNOSIS — S52.501D CLOSED FRACTURE OF DISTAL END OF RIGHT RADIUS WITH ROUTINE HEALING, UNSPECIFIED FRACTURE MORPHOLOGY, SUBSEQUENT ENCOUNTER: Primary | ICD-10-CM

## 2024-09-05 NOTE — TELEPHONE ENCOUNTER
----- Message from Kavitha Hensley sent at 9/5/2024 11:18 AM CDT -----  Contact: pt  Samm Can  Type: Needs Medical Advice      Who Called: pt whitney Can    Best Call Back Number: [phone]    Additional Information: Requesting a call back regarding   is stating the pt is still having pain and is asking for the office to provide her with a referral for Physical Therapy.       Please Advise- Thank you

## 2024-09-09 ENCOUNTER — CLINICAL SUPPORT (OUTPATIENT)
Dept: REHABILITATION | Facility: HOSPITAL | Age: 79
End: 2024-09-09
Attending: ORTHOPAEDIC SURGERY
Payer: MEDICARE

## 2024-09-09 DIAGNOSIS — M25.431 PAIN AND SWELLING OF RIGHT WRIST: ICD-10-CM

## 2024-09-09 DIAGNOSIS — S52.501D CLOSED FRACTURE OF DISTAL END OF RIGHT RADIUS WITH ROUTINE HEALING, UNSPECIFIED FRACTURE MORPHOLOGY, SUBSEQUENT ENCOUNTER: ICD-10-CM

## 2024-09-09 DIAGNOSIS — M25.531 PAIN AND SWELLING OF RIGHT WRIST: ICD-10-CM

## 2024-09-09 DIAGNOSIS — M25.631 DECREASED RANGE OF MOTION OF RIGHT WRIST: Primary | ICD-10-CM

## 2024-09-09 DIAGNOSIS — Z78.9 DECREASED ACTIVITIES OF DAILY LIVING (ADL): ICD-10-CM

## 2024-09-09 PROCEDURE — 97165 OT EVAL LOW COMPLEX 30 MIN: CPT

## 2024-09-09 PROCEDURE — 97110 THERAPEUTIC EXERCISES: CPT

## 2024-09-09 NOTE — PLAN OF CARE
"[facility]  Occupational Therapy Initial Evaluation     Name: Bernarda MARROQUIN [facility] Number: [id number]    Therapy Diagnosis:   Encounter Diagnosis   Name Primary?    Closed fracture of distal end of right radius with routine healing, unspecified fracture morphology, subsequent encounter      Physician: Nicanor Ceja,*    Physician Orders: Eval and Treat  Medical Diagnosis: S52.501D (ICD-10-CM) - Closed fracture of distal end of right radius with routine healing, unspecified fracture morphology, subsequent encounter   Date of Injury: Patient reports DOI is ~3-4 weeks ago. Based off patient chart, DOI was few days prior to 6/12/24 ~12 weeks ago.  Evaluation Date: 9/9/2024  Insurance Authorization Period Expiration: 9/5/25  Plan of Care Certification Period: 9/9/24-10/21/24  Date of Return to MD: Dr. Ceja 10/14/24  Visit # / Visits authorized: 1 / 1  FOTO: 1/1    Precautions:  Standard    Time In: 858  Time Out: 932  Total Appointment Time (timed & untimed codes): 34 minutes    Subjective     Date of Onset: Patient reports her injury occurred "awhile ago about 3-4 weeks ago." DOI was ~12 weeks ago per patient chart. On 6/12/24, patient saw Dr. Galdamez who referred her to Dr. Ceja after x-rays showed distal radius fracture along with fracture of ulnar styloid. First visit with Dr. Ceja was 6/17/24.    History of Current Condition/Mechanism of Injury: Patient reports this occurred when her 2 year old grandson got on her golf cart & hit her with it knocking her down. She states she guesses she tried to catch herself with her hand. She went to her PCP who referred her to ortho following her x-rays.     Involved Side: Right  Dominant Side: Right  Surgical Procedure: NA  Imaging: bone scan films     Prior Therapy: no    Pain:  Functional Pain Scale Rating 0-10:   4/10 on average  1/10 at best  5/10 at worst  Location: dorsal aspect of wrist - btoh ulnar and radial sided   Description: " "Patient unable to provide description of pain. She reports "it just hurts"  Aggravating Factors: Extension, Flexing, and Lifting  Easing Factors: rest    Occupation:    Working presently: retired   Duties:     Functional Limitations/Social History:    Previous functional status includes: Independent with all ADLs.     Current Functional Status   Home/Living environment: lives with their spouse      Limitation of Functional Status as follows:   ADLs/IADLs:     - Feeding: IND    - Bathing: IND but pain with certain motions    - Dressing/Grooming: IND but pain with certain motions    - Home Management: IND but pain with certain motions    - Driving: IND    Patient's Goals for Therapy: increased movement of wrist     Past Medical History/Physical Systems Review:   Bernarda Perera  has a past medical history of Anemia, Arthritis, Back pain, Hyperlipidemia, Hypertension, Mild anemia, Mild diastolic dysfunction, Obesity, Obesity, Other Alzheimer's disease (CODE), Overweight(278.02), Overweight(278.02), Trouble in sleeping, 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    Bernarda Perera  has a past surgical history that includes  section, low transverse; Belt abdominoplasty; and Breast cyst excision.    Bernarda has a current medication list which includes the following prescription(s): acetaminophen, amlodipine, aspirin, atenolol-chlorthalidone, donepezil, memantine, naproxen, omega-3 fatty acids, rosuvastatin, and triamcinolone acetonide 0.1%.    Review of patient's allergies indicates:   Allergen Reactions    Ace inhibitors Other (See Comments)     cough    Sulfa (sulfonamide antibiotics) Other (See Comments)     Other reaction(s): Unknown        Objective     Observation/Appearance: mild edema present at " wrist    Special Tests: NA    Edema. Measured in centimeters.   9/9/2024 9/9/2024      Left Right     Wrist Crease 17.4 17.6     Distal Palmar Crease 19.5 19.7         Elbow and Wrist ROM. Measured in degrees.   9/9/2024 9/9/2024      Left Right     Elbow Ext/Flex  WNL     Supination/Pronation  75/90     Wrist Ext/Flex  40/38     Wrist RD/UD  17/35       Hand ROM. Measured in degrees.  Hand/digital ROM WNL per patient report and visually. Patient able to make full composite digital flexion.        Strength (Dynamometer) and Pinch Strength (Pinch Gauge)  Measured in pounds.   9/9/2024 9/9/2024      Left Right     Rung II NT TBD NT TBD     Key Pinch NT TBD NT TBD     3pt Pinch NT TBD NT TBD       Sensation: Patient denies any numbness/tingling.       CMS Impairment/Limitation/Restriction for FOTO Hand Survey    Therapist reviewed FOTO scores for Bernarda Perera on 9/9/2024.   FOTO documents entered into [employer] [place] - see Media section.    Limitation Score: 48%         Treatment     Total Treatment time (time-based codes) separate from Evaluation: 15 minutes    Bernarda received the following supervised modalities after being cleared for contradictions for 5 minutes:   -MHP applied to right wrist to decrease pain and increase tissue extensibility prior to treatment.      Bernarda received therapeutic exercises for 10 minutes including:  -      Wrist AROM  Flx/ext  RD/ulnar deviation  Sup/pro  X10 each               Patient Education and Home Exercises      Education provided:   -role of OT, goals for OT, scheduling/cancellations, insurance limitations with patient.  -Additional Education provided: HEP provided    Written Home Exercises Provided: yes.  Exercises were reviewed and Bernarda was able to demonstrate them prior to the end of the session.    Bernarda demonstrated good  understanding of the education provided.     Pt was advised to perform these exercises free of pain, and to stop performing them if pain occurs.    See EMR  under Patient Instructions for exercises provided 9/9/2024.    Assessment     Bernarda Perera is a 79 y.o. female referred to outpatient occupational therapy and presents with a medical diagnosis of S52.501D (ICD-10-CM) - Closed fracture of distal end of right radius with routine healing, unspecified fracture morphology, subsequent encounter .  Advised patient to discontinue constant wear of prefab wrist immobilizer and opt for only sleeping in brace as this is increasing stiffness. Encouraged her to lightly use wrist as tolerated.     Assessment of Occupational Performance   Performance Deficits    Physical:  Joint Mobility  Muscle Power/Strength  Muscle Endurance  Edema   Strength  Pinch Strength  Gross Motor Coordination  Fine Motor Coordination  Pain    Cognitive:  No Deficits    Psychosocial:    Habits  Routines  Rituals     Following medical record review it is determined that pt will benefit from occupational therapy services in order to maximize pain free and/or functional use of right wrist. The following goals were discussed with the patient and patient is in agreement with them as to be addressed in the treatment plan. The patient's rehab potential is Good.     Anticipated barriers to occupational therapy: none    Plan of care discussed with patient: Yes  Patient's spiritual, cultural and educational needs considered and patient is agreeable to the plan of care and goals as stated below:     Medical Necessity is demonstrated by the following  Occupational Profile/History  Co-morbidities and personal factors that may impact the plan of care [x] LOW: Brief chart review  [] MODERATE: Expanded chart review   [] HIGH: Extensive chart review    Moderate / High Support Documentation:      Examination  Performance deficits relating to physical, cognitive or psychosocial skills that result in activity limitations and/or participation restrictions  [] LOW: addressing 1-3 Performance deficits  [x] MODERATE: 3-5  Performance deficits  [] HIGH: 5+ Performance deficits (please support below)    Moderate / High Support Documentation: decrease range of motion of wrist, increased edema, increased pain, decreased functional use, decreased strength     Treatment Options [] LOW: Limited options  [x] MODERATE: Several options  [x] HIGH: Multiple options      Decision Making/ Complexity Score: low       Goals:   The following goals were discussed with the patient and patient is in agreement with them as to be addressed in the treatment plan.   Short term Goals: (10/7/24)  1) Initiate HEP  2) Pt will increase AROM of right wrist by 5-10 degrees in order to assist with home management tasks by 4 weeks.  3) Pt will reduce edema by .5-1 cm in affected right wrist by 4 weeks.  4) Pt will reduce pain to less than 4/10 by 4 weeks.  5) Pt will increase functional  strength by 5# in order to A in opening containers for med management or home management tasks by 4 weeks.   6) Patient will be able to achieve less than or equal to 50% on the FOTO, demonstrating overall improved functional ability with upper extremity. (Self-care category)    Long Term Goals:  Goals to be met by discharge:  1) Independent with HEP  2) Pt will increase right wrist SALEH by 25 degrees in order to increase functional use for grasp with home management or work related tasks by d/c.   3) Pt will decrease edema to trace or none to increase functional ROM by d/c.   4) Pt will decrease pain to trace or none while completing light home management tasks or work related tasks by d/c.   5) Patient will be able to achieve less than or equal to 25% on the FOTO, demonstrating overall improved functional ability with upper extremity.  (Self-care category)      Plan     Certification Period/Plan of care expiration: 9/9/2024 to 10/21/24.    Outpatient Occupational Therapy 2 times weekly for 6 weeks to include the following interventions: Paraffin, Manual therapy/joint  mobilizations, Modalities for pain management, US 3 mhz, Therapeutic exercises/activities., Strengthening, Orthotic Fabrication/Fit/Training, Edema Control, Electrical Modalities, and Joint Protection.    Aura Townsend, OT

## 2024-09-09 NOTE — PATIENT INSTRUCTIONS
[facility], OCCUPATIONAL THERAPY  HOME EXERCISE PROGRAM     Complete the following two massages for 2 minutes, 2x/day:                                                       Complete the following exercises for 10 repetitions, 2x/day:         AROM: Supination / Pronation   With your elbow by your side, turn your   palm up then turn your palm down.      AROM: Wrist Flexion / Extension               Bend your wrist forward and back as far as possible.          AROM: Wrist Radial / Ulnar Deviation  Bend your wrist from side to side as far as possible.          Therapist: JENI Roberts

## 2024-09-11 NOTE — PROGRESS NOTES
"[facility]  Occupational Therapy Treatment Note     Date: 9/12/2024  Name: Bernarda MARROQUIN [facility] Number: [id number]    Therapy Diagnosis:        Encounter Diagnosis   Name Primary?    Closed fracture of distal end of right radius with routine healing, unspecified fracture morphology, subsequent encounter        Physician: Nicanor Ceja,*     Physician Orders: Eval and Treat  Medical Diagnosis: S52.501D (ICD-10-CM) - Closed fracture of distal end of right radius with routine healing, unspecified fracture morphology, subsequent encounter   Date of Injury: Patient reports DOI is ~3-4 weeks ago. Based off patient chart, DOI was few days prior to 6/12/24 ~12 weeks from 9/9/24  Evaluation Date: 9/9/2024  Insurance Authorization Period Expiration: 12/30/24  Plan of Care Certification Period: 9/9/24-10/21/24  Date of Return to MD: Dr. Ceja 10/14/24  Visit # / Visits authorized: 1 / 20  FOTO: 1/1     Precautions:  Standard     Time In: 2:49 PM  Time Out: 3:32 PM  Total Appointment Time (timed & untimed codes): 43 minutes    Subjective     Patient reports: she feels greater discomfort in her wrist after cutting the grass using riding .  She was not compliant with home exercise program given last session.   Response to previous treatment: "fine"  Functional change: patient reported no functional changes.    Pain: 7/10  Location: right wrists      Objective     Objective Measures updated at progress report unless specified.    Purdue Peg Test  Right Hand (30 seconds) 8   Left Hand (30 seconds) 7   Bilateral Hands (30 seconds) 10   Assembly (1 minute) 6      Hand Strength (ADAM Dynamometer, Setting II)   (lbs) Right  Left    1 27 31   2 21 30   3 30 25   Avg 9/12/2024 26 28.7   [norms for women aged 75+: R=42.6 +/-11.0; L=37.6 +/-8.9 (Martin et al, 1985)]    Pinch Right   9/12/2024  Left   9/12/2024    Lateral 7 7   Tip (2 point) 4 4   3 jaw javier 6 5   [Lateral pinch norms " for women aged 75+: right: 12.6+/-2.3; left: 11.4+/-2.6 (Martin et al, 1985)]  [2-point pinch norms for women aged 75+: right: 9.6+/-2.8; left: 9.3+/-2.4 (Martin et al, 1985)]   [3-point pinch norms for women aged 75+: right: 12.0+/-2.6; left: 11.5+/-2.6 (Martin et al, 1985)]     Treatment     Bernarda received the treatments listed below:      Bernarda received the following supervised modalities after being cleared for contradictions for  minutes:     Bernarda received the following direct contact modalities after being cleared for contraindications for  minutes:    manual therapy techniques:  were applied to the:  for  minutes, including:    therapeutic exercises to develop strength, endurance, ROM, and flexibility for 18 minutes including:  Patient performed right wrist flexion with forearm supported for 2 minutes, 2 repetitions.  Patient performed right wrist extension with forearm supported for 2 minutes, 2 repetitions.  Patient performed right forearm pronation to supination with forearm supported for 2 minutes, 2 repetitions.  Patient performed right wrist radial to ulnar deviation for 2 minutes.  Patient performed right wrist juxtacisor for 4 repetitions.    neuromuscular re-education activities to improve:  for  minutes. The following activities were included:    therapeutic activities to improve functional performance for 25  minutes, including:  Patient performed right wrist yellow theraputty pinch and removal of 9 marbles.  Patient performed right hand gross grasp using squish ball for 2 minutes.  OT administered Purdue Peg Test.  OT administered  and pinch strength assessment.    Patient Education and Home Exercises     Education provided:   - brace wear, home exercise program compliance, progression of rehab  - Progress towards goals     Written Home Exercises Provided: Pt instructed to continue prior HEP.  Exercises were reviewed and Bernarda was able to demonstrate them prior to the end of the  session.  Bernarda demonstrated good  understanding of the home exercise program provided. See electronic medical record under Patient Instructions for exercises provided during therapy sessions.       Assessment     OT administered Purdue Peg Test and  and pinch strength testing. Patient had no limitations in fine motor coordination. Patient demonstrated relatively equal  and pinch strength. Patient performed all therapeutic exercises well with relatively good range of motion with greatest limitation in pronation/supination. Patient did experience pain at end ranges of motion. She performed gentle strengthening with minimal difficulty. OT provided education on importance of compliance of home exercise program as well as weaning wrist brace to promote greater functional use in ADL/IADL.    Bernarda is progressing well towards her goals and there are no updates to goals at this time. Pt prognosis is Good.     Patient will continue to benefit from skilled outpatient occupational therapy to address the deficits listed in the problem list on initial evaluation provide patient/family education and to maximize patient's level of independence in the home and community environment.     Patient's spiritual, cultural and educational needs considered and patient agreeable to plan of care and goals.    Anticipated barriers to occupational therapy: None    Goals:  Short term Goals: (10/7/24)  1) Initiate home exercise program IN PROGRESS  2) Pt will increase AROM of right wrist by 5-10 degrees in order to assist with home management tasks by 4 weeks. IN PROGRESS  3) Pt will reduce edema by .5-1 cm in affected right wrist by 4 weeks. IN PROGRESS  4) Pt will reduce pain to less than 4/10 by 4 weeks. IN PROGRESS  5) Pt will increase functional  strength by 5# in order to A in opening containers for med management or home management tasks by 4 weeks. IN PROGRESS  6) Patient will be able to achieve less than or equal to 50% on  the FOTO, demonstrating overall improved functional ability with upper extremity. (Self-care category) IN PROGRESS     Long Term Goals:  Goals to be met by discharge:  1) Independent with home exercise program IN PROGRESS  2) Pt will increase right wrist SALEH by 25 degrees in order to increase functional use for grasp with home management or work related tasks by d/c. IN PROGRESS  3) Pt will decrease edema to trace or none to increase functional ROM by d/c. IN PROGRESS  4) Pt will decrease pain to trace or none while completing light home management tasks or work related tasks by d/c. IN PROGRESS  5) Patient will be able to achieve less than or equal to 25% on the FOTO, demonstrating overall improved functional ability with upper extremity.  (Self-care category) IN PROGRESS    Plan     Updates/Grading for next session: introduce heat followed by manual stretches to enhance wrist mobility    Teresa Ramires OT   9/12/2024

## 2024-09-12 ENCOUNTER — CLINICAL SUPPORT (OUTPATIENT)
Dept: REHABILITATION | Facility: HOSPITAL | Age: 79
End: 2024-09-12
Payer: MEDICARE

## 2024-09-12 DIAGNOSIS — Z78.9 DECREASED ACTIVITIES OF DAILY LIVING (ADL): ICD-10-CM

## 2024-09-12 DIAGNOSIS — M25.631 DECREASED RANGE OF MOTION OF RIGHT WRIST: Primary | ICD-10-CM

## 2024-09-12 DIAGNOSIS — M25.531 PAIN AND SWELLING OF RIGHT WRIST: ICD-10-CM

## 2024-09-12 DIAGNOSIS — M25.431 PAIN AND SWELLING OF RIGHT WRIST: ICD-10-CM

## 2024-09-12 PROCEDURE — 97530 THERAPEUTIC ACTIVITIES: CPT | Mod: KX,PN

## 2024-09-12 PROCEDURE — 97110 THERAPEUTIC EXERCISES: CPT | Mod: KX,PN

## 2024-09-18 ENCOUNTER — CLINICAL SUPPORT (OUTPATIENT)
Dept: REHABILITATION | Facility: HOSPITAL | Age: 79
End: 2024-09-18
Attending: ORTHOPAEDIC SURGERY
Payer: MEDICARE

## 2024-09-18 DIAGNOSIS — Z78.9 DECREASED ACTIVITIES OF DAILY LIVING (ADL): ICD-10-CM

## 2024-09-18 DIAGNOSIS — M25.431 PAIN AND SWELLING OF RIGHT WRIST: ICD-10-CM

## 2024-09-18 DIAGNOSIS — M25.531 PAIN AND SWELLING OF RIGHT WRIST: ICD-10-CM

## 2024-09-18 DIAGNOSIS — M25.631 DECREASED RANGE OF MOTION OF RIGHT WRIST: Primary | ICD-10-CM

## 2024-09-18 PROCEDURE — 97140 MANUAL THERAPY 1/> REGIONS: CPT | Mod: PN

## 2024-09-18 PROCEDURE — 97530 THERAPEUTIC ACTIVITIES: CPT | Mod: PN

## 2024-09-18 PROCEDURE — 97110 THERAPEUTIC EXERCISES: CPT | Mod: PN

## 2024-09-18 NOTE — PROGRESS NOTES
"[facility]  Occupational Therapy Treatment Note     Date: 9/18/2024  Name: Bernarda MARROQUIN [facility] Number: [id number]    Therapy Diagnosis:        Encounter Diagnosis   Name Primary?    Closed fracture of distal end of right radius with routine healing, unspecified fracture morphology, subsequent encounter        Physician: Nicanor Ceja,*     Physician Orders: Eval and Treat  Medical Diagnosis: S52.501D (ICD-10-CM) - Closed fracture of distal end of right radius with routine healing, unspecified fracture morphology, subsequent encounter   Date of Injury: Patient reports DOI is ~3-4 weeks ago. Based off patient chart, DOI was few days prior to 6/12/24 ~12 weeks from 9/9/24  Evaluation Date: 9/9/2024  Insurance Authorization Period Expiration: 12/30/24  Plan of Care Certification Period: 9/9/24-10/21/24  Date of Return to MD: Dr. Ceja 10/14/24  Visit # / Visits authorized: 2 / 20  FOTO: 1/3     Precautions:  Standard     Time In: 1:16 PM  Time Out: 2:02 PM  Total Appointment Time (timed & untimed codes):  46 minutes    Subjective     Patient reports: "I'm about the same. No changes."  She was not compliant with home exercise program given last session.   Response to previous treatment: "fine"  Functional change: patient reported no functional changes.    Pain: 7/10  Location: right wrists      Objective     Objective Measures updated at progress report unless specified.    Treatment     Bernarda received the treatments listed below:      Bernarda received the following supervised modalities after being cleared for contradictions for  minutes:     Bernarda received the following direct contact modalities after being cleared for contraindications for 7 minutes:  OT introduced heat pack to patient's right wrist to enhance mobility prior to manual stretches.    manual therapy techniques:  were applied to the: right upper extremity for  12 minutes, including:  OT performed passive range of motion " wrist flexion of the right wrist for 1 minute hold, 3 repetitions.  OT performed passive range of motion wrist extension of the right wrist for 1 minute hold, 3 repetitions.  OT performed passive range of motion pronation of the right forearm for 1 minute hold, 2 repetitions.  OT performed passive range of motion supination of the right forearm for 1 minute hold, 2 repetitions.    therapeutic exercises to develop strength, endurance, ROM, and flexibility for 10 minutes including:  Patient performed right wrist flexion with forearm supported for 2 minutes, 1 repetition.  Patient performed right wrist extension with forearm supported for 2 minutes, 1 repetition.  Patient performed right forearm pronation to supination with forearm supported for 2 minutes, 1 repetition.  Patient performed right wrist radial to ulnar deviation for 3 sets of 10 repetitions.  Patient performed right wrist juxtacisor for 3 repetitions.    neuromuscular re-education activities to improve:  for  minutes. The following activities were included:    therapeutic activities to improve functional performance for 17 minutes, including:  Patient performed right wrist yellow theraputty pinch and removal of 9 marbles.  Patient performed right hand tip pinch to place and remove 8 green clothespins.  Patient performed right hand tripod pinch to place and remove 8 blue clothespins.  Patient performed right hand lateral pinch to place and remove 8 blue clothespins.    Patient Education and Home Exercises     Education provided:   - brace wear, progression of rehab  - Progress towards goals     Written Home Exercises Provided: Pt instructed to continue prior HEP.  Exercises were reviewed and Bernarda was able to demonstrate them prior to the end of the session.  Bernarda demonstrated good  understanding of the home exercise program provided. See electronic medical record under Patient Instructions for exercises provided during therapy sessions.       Assessment      Patient arrived wearing wrist brace, and she stated she only takes it off for showers. OT encouraged patient to remove wrist brace to engage in light functional daily tasks to encourage greater wrist mobility as well as to progress gentle strengthening. Patient verbalized understanding, though she needed to be reminded twice in session when prompted regarding brace wear. Patient tolerated all manual stretches with relatively good motion, noting the greatest limitations in wrist flexion/extension. Patient reported pain with supination but achieved good motion. Patient demonstrated good  and pinch strength in therapeutic activities.    Bernarda is progressing well towards her goals and there are no updates to goals at this time. Pt prognosis is Good.     Patient will continue to benefit from skilled outpatient occupational therapy to address the deficits listed in the problem list on initial evaluation provide patient/family education and to maximize patient's level of independence in the home and community environment.     Patient's spiritual, cultural and educational needs considered and patient agreeable to plan of care and goals.    Anticipated barriers to occupational therapy: None    Goals:  Short term Goals: (10/7/24)  1) Initiate home exercise program IN PROGRESS  2) Pt will increase AROM of right wrist by 5-10 degrees in order to assist with home management tasks by 4 weeks. IN PROGRESS  3) Pt will reduce edema by .5-1 cm in affected right wrist by 4 weeks. IN PROGRESS  4) Pt will reduce pain to less than 4/10 by 4 weeks. IN PROGRESS  5) Pt will increase functional  strength by 5# in order to A in opening containers for med management or home management tasks by 4 weeks. IN PROGRESS  6) Patient will be able to achieve less than or equal to 50% on the FOTO, demonstrating overall improved functional ability with upper extremity. (Self-care category) IN PROGRESS     Long Term Goals:  Goals to be met by  discharge:  1) Independent with home exercise program IN PROGRESS  2) Pt will increase right wrist SALEH by 25 degrees in order to increase functional use for grasp with home management or work related tasks by d/c. IN PROGRESS  3) Pt will decrease edema to trace or none to increase functional ROM by d/c. IN PROGRESS  4) Pt will decrease pain to trace or none while completing light home management tasks or work related tasks by d/c. IN PROGRESS  5) Patient will be able to achieve less than or equal to 25% on the FOTO, demonstrating overall improved functional ability with upper extremity.  (Self-care category) IN PROGRESS    Plan     Updates/Grading for next session: progress strengthening as tolerated    Teresa Ramires, OT   9/18/2024

## 2024-09-27 ENCOUNTER — CLINICAL SUPPORT (OUTPATIENT)
Dept: REHABILITATION | Facility: HOSPITAL | Age: 79
End: 2024-09-27
Payer: MEDICARE

## 2024-09-27 DIAGNOSIS — M25.631 DECREASED RANGE OF MOTION OF RIGHT WRIST: Primary | ICD-10-CM

## 2024-09-27 DIAGNOSIS — M25.531 PAIN AND SWELLING OF RIGHT WRIST: ICD-10-CM

## 2024-09-27 DIAGNOSIS — M25.431 PAIN AND SWELLING OF RIGHT WRIST: ICD-10-CM

## 2024-09-27 DIAGNOSIS — Z78.9 DECREASED ACTIVITIES OF DAILY LIVING (ADL): ICD-10-CM

## 2024-09-27 PROCEDURE — 97530 THERAPEUTIC ACTIVITIES: CPT | Mod: PN

## 2024-09-27 PROCEDURE — 97110 THERAPEUTIC EXERCISES: CPT | Mod: PN

## 2024-09-27 PROCEDURE — 97140 MANUAL THERAPY 1/> REGIONS: CPT | Mod: PN

## 2024-09-27 NOTE — PROGRESS NOTES
"[facility]  Occupational Therapy Treatment Note     Date: 9/27/2024  Name: Bernarda MARROQUIN [facility] Number: [id number]    Therapy Diagnosis:        Encounter Diagnosis   Name Primary?    Closed fracture of distal end of right radius with routine healing, unspecified fracture morphology, subsequent encounter        Physician: Nicanor Ceja,*     Physician Orders: Eval and Treat  Medical Diagnosis: S52.501D (ICD-10-CM) - Closed fracture of distal end of right radius with routine healing, unspecified fracture morphology, subsequent encounter   Date of Injury: Patient reports DOI is ~3-4 weeks ago. Based off patient chart, DOI was few days prior to 6/12/24 ~12 weeks from 9/9/24  Evaluation Date: 9/9/2024  Insurance Authorization Period Expiration: 12/30/24  Plan of Care Certification Period: 9/9/24-10/21/24  Date of Return to MD: Dr. Ceja 10/14/24  Visit # / Visits authorized: 3 / 20  FOTO: 1/3     Precautions:  Standard     Time In: 1:59 PM  Time Out: 2:41 PM  Total Appointment Time (timed & untimed codes):  42 minutes    Subjective     Patient reports: she is no longer wearing her wrist brace, but she feels more stiff.  She was not compliant with home exercise program given last session.   Response to previous treatment: "fine"  Functional change: patient reported no functional changes.    Pain: 0/10  Location: right wrist      Objective     Objective Measures updated at progress report unless specified.    Treatment     Bernarda received the treatments listed below:      Bernarda received the following supervised modalities after being cleared for contradictions for  minutes:     Bernarda received the following direct contact modalities after being cleared for contraindications for 7 minutes:  OT introduced heat pack to patient's right wrist to enhance mobility prior to manual stretches.    manual therapy techniques:  were applied to the: right upper extremity for  12 minutes, including:  OT " performed passive range of motion wrist flexion of the right wrist for 1 minute hold, 3 repetitions.  OT performed passive range of motion wrist extension of the right wrist for 1 minute hold, 3 repetitions.  OT performed passive range of motion pronation of the right forearm for 1 minute hold, 2 repetitions.  OT performed passive range of motion supination of the right forearm for 1 minute hold, 2 repetitions.    therapeutic exercises to develop strength, endurance, ROM, and flexibility for 10 minutes including:  Patient performed right wrist flexion with forearm supported for 2 minutes, 1 repetition.  Patient performed right wrist extension with forearm supported for 2 minutes, 1 repetition.  Patient performed right forearm pronation to supination with forearm supported for 2 minutes, 1 repetition.  Patient performed right wrist radial to ulnar deviation for 3 sets of 10 repetitions.  Patient performed right wrist juxtacisor for 5 repetitions.    neuromuscular re-education activities to improve:  for  minutes. The following activities were included:    therapeutic activities to improve functional performance for 13 minutes, including:  Patient performed right wrist yellow theraputty pinch and removal of 9 marbles.  Patient performed right hand tip pinch to place and remove 8 green clothespins.  Patient performed right hand tripod pinch to place and remove 8 blue clothespins.  Patient performed right hand lateral pinch to place and remove 8 blue and black clothespins.  Patient performed right hand gross grasp using green web for 3 sets of 10 repetitions.    Patient Education and Home Exercises     Education provided:   - home exercise program compliance, progression of rehab  - Progress towards goals     Written Home Exercises Provided: Pt instructed to continue prior HEP.  Exercises were reviewed and Bernarda was able to demonstrate them prior to the end of the session.  Bernarda demonstrated good  understanding of the  home exercise program provided. See electronic medical record under Patient Instructions for exercises provided during therapy sessions.       Assessment     Patient is no longer wearing her wrist brace, but continued to report wrist stiffness. Initiated session with heat to enhance joint mobility prior to mobilizations. Patient achieved good wrist range of motion with limitations in wrist extension due to pain. Patient performed all therapeutic exercises with good form and muscular endurance. She demonstrated good  and pinch strength with no report of pain.    Bernarda is progressing well towards her goals and there are no updates to goals at this time. Pt prognosis is Good.     Patient will continue to benefit from skilled outpatient occupational therapy to address the deficits listed in the problem list on initial evaluation provide patient/family education and to maximize patient's level of independence in the home and community environment.     Patient's spiritual, cultural and educational needs considered and patient agreeable to plan of care and goals.    Anticipated barriers to occupational therapy: None    Goals:  Short term Goals: (10/7/24)  1) Initiate home exercise program IN PROGRESS  2) Pt will increase AROM of right wrist by 5-10 degrees in order to assist with home management tasks by 4 weeks. IN PROGRESS  3) Pt will reduce edema by .5-1 cm in affected right wrist by 4 weeks. IN PROGRESS  4) Pt will reduce pain to less than 4/10 by 4 weeks. IN PROGRESS  5) Pt will increase functional  strength by 5# in order to A in opening containers for med management or home management tasks by 4 weeks. IN PROGRESS  6) Patient will be able to achieve less than or equal to 50% on the FOTO, demonstrating overall improved functional ability with upper extremity. (Self-care category) IN PROGRESS     Long Term Goals:  Goals to be met by discharge:  1) Independent with home exercise program IN PROGRESS  2) Pt will  increase right wrist SALEH by 25 degrees in order to increase functional use for grasp with home management or work related tasks by d/c. IN PROGRESS  3) Pt will decrease edema to trace or none to increase functional ROM by d/c. IN PROGRESS  4) Pt will decrease pain to trace or none while completing light home management tasks or work related tasks by d/c. IN PROGRESS  5) Patient will be able to achieve less than or equal to 25% on the FOTO, demonstrating overall improved functional ability with upper extremity.  (Self-care category) IN PROGRESS    Plan     Updates/Grading for next session: progress strengthening as tolerated    Teresa Ramires, OT   9/27/2024

## 2024-10-04 ENCOUNTER — CLINICAL SUPPORT (OUTPATIENT)
Dept: REHABILITATION | Facility: HOSPITAL | Age: 79
End: 2024-10-04
Payer: MEDICARE

## 2024-10-04 DIAGNOSIS — M25.531 PAIN AND SWELLING OF RIGHT WRIST: ICD-10-CM

## 2024-10-04 DIAGNOSIS — M25.631 DECREASED RANGE OF MOTION OF RIGHT WRIST: Primary | ICD-10-CM

## 2024-10-04 DIAGNOSIS — Z78.9 DECREASED ACTIVITIES OF DAILY LIVING (ADL): ICD-10-CM

## 2024-10-04 DIAGNOSIS — M25.431 PAIN AND SWELLING OF RIGHT WRIST: ICD-10-CM

## 2024-10-04 PROCEDURE — 97530 THERAPEUTIC ACTIVITIES: CPT | Mod: PN

## 2024-10-04 PROCEDURE — 97110 THERAPEUTIC EXERCISES: CPT | Mod: PN

## 2024-10-04 PROCEDURE — 97140 MANUAL THERAPY 1/> REGIONS: CPT | Mod: PN

## 2024-10-04 NOTE — PROGRESS NOTES
"[facility]  Occupational Therapy Treatment Note     Date: 10/4/2024  Name: Bernarda MARROQUIN [facility] Number: [id number]    Therapy Diagnosis:        Encounter Diagnosis   Name Primary?    Closed fracture of distal end of right radius with routine healing, unspecified fracture morphology, subsequent encounter        Physician: Nicanor Ceja,*     Physician Orders: Eval and Treat  Medical Diagnosis: S52.501D (ICD-10-CM) - Closed fracture of distal end of right radius with routine healing, unspecified fracture morphology, subsequent encounter   Date of Injury: Patient reports DOI is ~3-4 weeks ago. Based off patient chart, DOI was few days prior to 6/12/24 ~12 weeks from 9/9/24  Evaluation Date: 9/9/2024  Insurance Authorization Period Expiration: 12/30/24  Plan of Care Certification Period: 9/9/24-10/21/24  Date of Return to MD: Dr. Ceja 10/14/24  Visit # / Visits authorized: 4 / 20  FOTO: 2/3     Precautions:  Standard     Time In: 1:58 PM  Time Out: 2:41 PM  Total Appointment Time (timed & untimed codes):  43 minutes    Subjective     Patient reports: "the pain is still there, but it's not bad."  She was not compliant with home exercise program given last session.   Response to previous treatment: "fine"  Functional change: patient reported no functional changes.    Pain: 6/10  Location: right wrist      Objective     Objective Measures updated at progress report unless specified.    FOTO: 68%    Treatment     Bernarda received the treatments listed below:      Bernarda received the following supervised modalities after being cleared for contradictions for  minutes:     Bernarda received the following direct contact modalities after being cleared for contraindications for 7 minutes:  OT introduced heat pack to patient's right wrist to enhance mobility prior to manual stretches.    manual therapy techniques:  were applied to the: right upper extremity for  12 minutes, including:  OT performed " passive range of motion wrist flexion of the right wrist for 1 minute hold, 3 repetitions.  OT performed passive range of motion wrist extension of the right wrist for 1 minute hold, 3 repetitions.  OT performed passive range of motion pronation of the right forearm for 1 minute hold, 2 repetitions.  OT performed passive range of motion supination of the right forearm for 1 minute hold, 2 repetitions.    therapeutic exercises to develop strength, endurance, ROM, and flexibility for 10 minutes including:  Patient performed right wrist flexion with forearm supported using 1# for 2 minutes, 1 repetition.  Patient performed right wrist extension with forearm supported using 1# for 2 minutes, 1 repetition.  Patient performed right forearm pronation to supination with forearm supported using 1# distal weight for 2 minutes, 1 repetition.  Patient performed right wrist radial to ulnar deviation using 1 for 3 sets of 10 repetitions.  Patient performed right wrist juxtacisor for 5 repetitions.     neuromuscular re-education activities to improve: right wrist for 3 minutes. The following activities were included:  Patient performed right upper extremity weight bearing onto the right wrist to compress green therapy ball for 3 sets of 10 repetitions.     therapeutic activities to improve functional performance for 11 minutes, including:  OT provided education on theraputty home exercise program with patient demonstrating 10 repetitions of gross grasp, thumb flexion, tripod pinch, tip pinch, and lateral pinch.    Patient Education and Home Exercises     Education provided:   - home exercise program compliance, progression of rehab  - Progress towards goals     Written Home Exercises Provided: Yes.  Exercises were reviewed and Bernarda was able to demonstrate them prior to the end of the session.  Bernarda demonstrated good  understanding of the home exercise program provided. See electronic medical record under Patient Instructions for  exercises provided during therapy sessions.       Assessment     Patient demonstrated relatively good wrist mobility following application of heat. OT introduced light weighted resistance in forearm strengthening exercises with good muscular endurance noted as no rest breaks were required. She noted greatest wrist pain in extension. Provided theraputty and home exercise program for continued strengthening at home with patient demonstrating understanding.    Bernarda is progressing well towards her goals and there are no updates to goals at this time. Pt prognosis is Good.     Patient will continue to benefit from skilled outpatient occupational therapy to address the deficits listed in the problem list on initial evaluation provide patient/family education and to maximize patient's level of independence in the home and community environment.     Patient's spiritual, cultural and educational needs considered and patient agreeable to plan of care and goals.    Anticipated barriers to occupational therapy: None    Goals:  Short term Goals: (10/7/24)  1) Initiate home exercise program IN PROGRESS  2) Pt will increase AROM of right wrist by 5-10 degrees in order to assist with home management tasks by 4 weeks. IN PROGRESS  3) Pt will reduce edema by .5-1 cm in affected right wrist by 4 weeks. IN PROGRESS  4) Pt will reduce pain to less than 4/10 by 4 weeks. IN PROGRESS  5) Pt will increase functional  strength by 5# in order to A in opening containers for med management or home management tasks by 4 weeks. IN PROGRESS  6) Patient will be able to achieve less than or equal to 50% on the FOTO, demonstrating overall improved functional ability with upper extremity. (Self-care category) IN PROGRESS     Long Term Goals:  Goals to be met by discharge:  1) Independent with home exercise program IN PROGRESS  2) Pt will increase right wrist SALEH by 25 degrees in order to increase functional use for grasp with home management or  work related tasks by d/c. IN PROGRESS  3) Pt will decrease edema to trace or none to increase functional ROM by d/c. IN PROGRESS  4) Pt will decrease pain to trace or none while completing light home management tasks or work related tasks by d/c. IN PROGRESS  5) Patient will be able to achieve less than or equal to 25% on the FOTO, demonstrating overall improved functional ability with upper extremity.  (Self-care category) IN PROGRESS    Plan     Updates/Grading for next session: progress strengthening as tolerated. Assess range of motion and  and pinch strength.    Teresa Ramires OT   10/4/2024

## 2024-10-14 ENCOUNTER — OFFICE VISIT (OUTPATIENT)
Dept: ORTHOPEDICS | Facility: CLINIC | Age: 79
End: 2024-10-14
Payer: MEDICARE

## 2024-10-14 ENCOUNTER — HOSPITAL ENCOUNTER (OUTPATIENT)
Dept: RADIOLOGY | Facility: HOSPITAL | Age: 79
Discharge: HOME | End: 2024-10-14
Attending: ORTHOPAEDIC SURGERY
Payer: MEDICARE

## 2024-10-14 VITALS — WEIGHT: 167 LBS | HEIGHT: 64 IN | BODY MASS INDEX: 28.51 KG/M2

## 2024-10-14 DIAGNOSIS — S52.501D CLOSED FRACTURE OF DISTAL END OF RIGHT RADIUS WITH ROUTINE HEALING, UNSPECIFIED FRACTURE MORPHOLOGY, SUBSEQUENT ENCOUNTER: Primary | ICD-10-CM

## 2024-10-14 DIAGNOSIS — S52.501D CLOSED FRACTURE OF DISTAL END OF RIGHT RADIUS WITH ROUTINE HEALING, UNSPECIFIED FRACTURE MORPHOLOGY, SUBSEQUENT ENCOUNTER: ICD-10-CM

## 2024-10-14 PROCEDURE — 1159F MED LIST DOCD IN RCRD: CPT | Mod: CPTII,S$GLB,, | Performed by: ORTHOPAEDIC SURGERY

## 2024-10-14 PROCEDURE — 1160F RVW MEDS BY RX/DR IN RCRD: CPT | Mod: CPTII,S$GLB,, | Performed by: ORTHOPAEDIC SURGERY

## 2024-10-14 PROCEDURE — 3288F FALL RISK ASSESSMENT DOCD: CPT | Mod: CPTII,S$GLB,, | Performed by: ORTHOPAEDIC SURGERY

## 2024-10-14 PROCEDURE — 99214 OFFICE O/P EST MOD 30 MIN: CPT | Mod: S$GLB,,, | Performed by: ORTHOPAEDIC SURGERY

## 2024-10-14 PROCEDURE — 1101F PT FALLS ASSESS-DOCD LE1/YR: CPT | Mod: CPTII,S$GLB,, | Performed by: ORTHOPAEDIC SURGERY

## 2024-10-14 PROCEDURE — 73110 X-RAY EXAM OF WRIST: CPT | Mod: 26,RT,, | Performed by: RADIOLOGY

## 2024-10-14 PROCEDURE — 73110 X-RAY EXAM OF WRIST: CPT | Mod: TC,PN,RT

## 2024-10-14 PROCEDURE — 99999 PR PBB SHADOW E&M-EST. PATIENT-LVL III: CPT | Mod: PBBFAC,,, | Performed by: ORTHOPAEDIC SURGERY

## 2024-10-14 PROCEDURE — 1125F AMNT PAIN NOTED PAIN PRSNT: CPT | Mod: CPTII,S$GLB,, | Performed by: ORTHOPAEDIC SURGERY
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
## 2024-11-11 ENCOUNTER — LAB VISIT (OUTPATIENT)
Dept: LAB | Facility: HOSPITAL | Age: 79
End: 2024-11-11
Attending: FAMILY MEDICINE
Payer: MEDICARE

## 2024-11-11 ENCOUNTER — OFFICE VISIT (OUTPATIENT)
Dept: FAMILY MEDICINE | Facility: CLINIC | Age: 79
End: 2024-11-11
Payer: MEDICARE

## 2024-11-11 VITALS
OXYGEN SATURATION: 97 % | DIASTOLIC BLOOD PRESSURE: 60 MMHG | WEIGHT: 159.19 LBS | SYSTOLIC BLOOD PRESSURE: 138 MMHG | HEIGHT: 64 IN | HEART RATE: 91 BPM | RESPIRATION RATE: 18 BRPM | BODY MASS INDEX: 27.18 KG/M2

## 2024-11-11 DIAGNOSIS — E78.5 HYPERLIPIDEMIA WITH TARGET LDL LESS THAN 100: Chronic | ICD-10-CM

## 2024-11-11 DIAGNOSIS — E11.319 CONTROLLED TYPE 2 DIABETES MELLITUS WITH RETINOPATHY WITHOUT MACULAR EDEMA, WITHOUT LONG-TERM CURRENT USE OF INSULIN, UNSPECIFIED LATERALITY, UNSPECIFIED RETINOPATHY SEVERITY: Chronic | ICD-10-CM

## 2024-11-11 DIAGNOSIS — I10 ESSENTIAL HYPERTENSION: ICD-10-CM

## 2024-11-11 DIAGNOSIS — E78.5 HYPERLIPIDEMIA WITH TARGET LDL LESS THAN 100: Primary | Chronic | ICD-10-CM

## 2024-11-11 DIAGNOSIS — I10 ESSENTIAL HYPERTENSION: Chronic | ICD-10-CM

## 2024-11-11 DIAGNOSIS — E11.319 CONTROLLED TYPE 2 DIABETES MELLITUS WITH RETINOPATHY WITHOUT MACULAR EDEMA, WITHOUT LONG-TERM CURRENT USE OF INSULIN, UNSPECIFIED LATERALITY, UNSPECIFIED RETINOPATHY SEVERITY: ICD-10-CM

## 2024-11-11 LAB
ALBUMIN SERPL BCP-MCNC: 3.9 G/DL (ref 3.5–5.2)
ALP SERPL-CCNC: 53 U/L (ref 40–150)
ALT SERPL W/O P-5'-P-CCNC: 15 U/L (ref 10–44)
ANION GAP SERPL CALC-SCNC: 9 MMOL/L (ref 8–16)
AST SERPL-CCNC: 21 U/L (ref 10–40)
BILIRUB SERPL-MCNC: 0.4 MG/DL (ref 0.1–1)
BUN SERPL-MCNC: 13 MG/DL (ref 8–23)
CALCIUM SERPL-MCNC: 9.9 MG/DL (ref 8.7–10.5)
CHLORIDE SERPL-SCNC: 104 MMOL/L (ref 95–110)
CHOLEST SERPL-MCNC: 276 MG/DL (ref 120–199)
CHOLEST/HDLC SERPL: 3.4 {RATIO} (ref 2–5)
CO2 SERPL-SCNC: 24 MMOL/L (ref 23–29)
CREAT SERPL-MCNC: 0.9 MG/DL (ref 0.5–1.4)
EST. GFR  (NO RACE VARIABLE): >60 ML/MIN/1.73 M^2
ESTIMATED AVG GLUCOSE: 114 MG/DL (ref 68–131)
GLUCOSE SERPL-MCNC: 177 MG/DL (ref 70–110)
HBA1C MFR BLD: 5.6 % (ref 4–5.6)
HDLC SERPL-MCNC: 82 MG/DL (ref 40–75)
HDLC SERPL: 29.7 % (ref 20–50)
LDLC SERPL CALC-MCNC: 161 MG/DL (ref 63–159)
NONHDLC SERPL-MCNC: 194 MG/DL
POTASSIUM SERPL-SCNC: 4.3 MMOL/L (ref 3.5–5.1)
PROT SERPL-MCNC: 7.3 G/DL (ref 6–8.4)
SODIUM SERPL-SCNC: 137 MMOL/L (ref 136–145)
TRIGL SERPL-MCNC: 165 MG/DL (ref 30–150)

## 2024-11-11 PROCEDURE — 1160F RVW MEDS BY RX/DR IN RCRD: CPT | Mod: CPTII,S$GLB,, | Performed by: FAMILY MEDICINE

## 2024-11-11 PROCEDURE — 80053 COMPREHEN METABOLIC PANEL: CPT | Performed by: FAMILY MEDICINE

## 2024-11-11 PROCEDURE — 36415 COLL VENOUS BLD VENIPUNCTURE: CPT | Performed by: FAMILY MEDICINE

## 2024-11-11 PROCEDURE — 3288F FALL RISK ASSESSMENT DOCD: CPT | Mod: CPTII,S$GLB,, | Performed by: FAMILY MEDICINE

## 2024-11-11 PROCEDURE — 99999 PR PBB SHADOW E&M-EST. PATIENT-LVL IV: CPT | Mod: PBBFAC,,, | Performed by: FAMILY MEDICINE

## 2024-11-11 PROCEDURE — 1101F PT FALLS ASSESS-DOCD LE1/YR: CPT | Mod: CPTII,S$GLB,, | Performed by: FAMILY MEDICINE

## 2024-11-11 PROCEDURE — 80061 LIPID PANEL: CPT | Performed by: FAMILY MEDICINE

## 2024-11-11 PROCEDURE — 1125F AMNT PAIN NOTED PAIN PRSNT: CPT | Mod: CPTII,S$GLB,, | Performed by: FAMILY MEDICINE

## 2024-11-11 PROCEDURE — 1159F MED LIST DOCD IN RCRD: CPT | Mod: CPTII,S$GLB,, | Performed by: FAMILY MEDICINE

## 2024-11-11 PROCEDURE — 99214 OFFICE O/P EST MOD 30 MIN: CPT | Mod: S$GLB,,, | Performed by: FAMILY MEDICINE

## 2024-11-11 PROCEDURE — 3078F DIAST BP <80 MM HG: CPT | Mod: CPTII,S$GLB,, | Performed by: FAMILY MEDICINE

## 2024-11-11 PROCEDURE — 83036 HEMOGLOBIN GLYCOSYLATED A1C: CPT | Performed by: FAMILY MEDICINE

## 2024-11-11 PROCEDURE — 3075F SYST BP GE 130 - 139MM HG: CPT | Mod: CPTII,S$GLB,, | Performed by: FAMILY MEDICINE

## 2024-11-11 NOTE — PROGRESS NOTES
Subjective:       Patient ID: Bernarda Perera is a 79 y.o. female.    Chief Complaint: Hypertension    Ms. Perera presents today to follow up.     Hypertension: Previously well controlled with amlodipine, tenoretic.     Memory issues: She is taking aricept and namenda. She is very forgetful she says. It has been about the same. When she drinks she is bad per her spouse    She is taking aspirin and omega 3s as well.         .  Patient Active Problem List   Diagnosis    Essential hypertension    Hyperlipidemia with target LDL less than 100    Anemia    Syncope and collapse    Mild diastolic dysfunction    Controlled type 2 diabetes with borderline retinopathy    SOB (shortness of breath)    Palpitations    Atherosclerosis of aorta    Mild cognitive impairment    Other Alzheimer's disease (CODE)    Decreased range of motion of right wrist    Decreased activities of daily living (ADL)    Pain and swelling of right wrist     Bernarda has a current medication list which includes the following prescription(s): acetaminophen, amlodipine, aspirin, atenolol-chlorthalidone, donepezil, memantine, naproxen, omega-3 fatty acids, rosuvastatin, and triamcinolone acetonide 0.1%.    Review of Systems   Constitutional:  Negative for activity change, appetite change, fatigue and fever.   Respiratory:  Negative for shortness of breath.    Gastrointestinal:  Negative for abdominal pain.   Integumentary:  Negative for rash.         Health Maintenance Due   Topic Date Due    Shingles Vaccine (2 of 3) 08/18/2015    Eye Exam  12/28/2016    RSV Vaccine (Age 60+ and Pregnant patients) (1 - 1-dose 75+ series) Never done    DEXA Scan  11/11/2023    Lipid Panel  08/21/2024    COVID-19 Vaccine (4 - 2024-25 season) 09/01/2024      Health Maintenance reviewed and discussed- Does not know the last time she saw an eye doctor; labs ordered; encouraged to schedule DEXA  Objective:      Physical Exam  Vitals and nursing note reviewed.   Constitutional:        General: She is not in acute distress.     Appearance: She is not ill-appearing.   Cardiovascular:      Rate and Rhythm: Normal rate and regular rhythm.      Heart sounds: No murmur heard.  Pulmonary:      Effort: Pulmonary effort is normal.      Breath sounds: Normal breath sounds. No wheezing.   Skin:     General: Skin is warm and dry.      Findings: No rash.   Neurological:      Mental Status: She is alert.   Psychiatric:         Mood and Affect: Mood normal.         Behavior: Behavior normal.         Assessment:       1. Hyperlipidemia with target LDL less than 100    2. 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 Essential hypertension        Plan:       1. Hyperlipidemia with target LDL less than 100  Assessment & Plan:  Lab Results   Component Value Date    CHOL 347 (H) 08/21/2023    CHOL 278 (H) 11/15/2021    CHOL 257 (H) 11/11/2020     Lab Results   Component Value Date    HDL 69 08/21/2023    HDL 71 11/15/2021    HDL 76 (H) 11/11/2020     Lab Results   Component Value Date    LDLCALC 253.2 (H) 08/21/2023    LDLCALC 179.6 (H) 11/15/2021    LDLCALC 164.4 (H) 11/11/2020     Lab Results   Component Value Date    TRIG 124 08/21/2023    TRIG 137 11/15/2021    TRIG 83 11/11/2020       Lab Results   Component Value Date    CHOLHDL 19.9 (L) 08/21/2023    CHOLHDL 25.5 11/15/2021    CHOLHDL 29.6 11/11/2020         Orders:  -     Lipid panel; Future; Expected date: 11/11/2024    2. 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 Essential hypertension  -     Comprehensive metabolic panel;  Future; Expected date: 11/11/2024

## 2024-11-11 NOTE — ASSESSMENT & PLAN NOTE
Lab Results   Component Value Date    CHOL 347 (H) 08/21/2023    CHOL 278 (H) 11/15/2021    CHOL 257 (H) 11/11/2020     Lab Results   Component Value Date    HDL 69 08/21/2023    HDL 71 11/15/2021    HDL 76 (H) 11/11/2020     Lab Results   Component Value Date    LDLCALC 253.2 (H) 08/21/2023    LDLCALC 179.6 (H) 11/15/2021    LDLCALC 164.4 (H) 11/11/2020     Lab Results   Component Value Date    TRIG 124 08/21/2023    TRIG 137 11/15/2021    TRIG 83 11/11/2020       Lab Results   Component Value Date    CHOLHDL 19.9 (L) 08/21/2023    CHOLHDL 25.5 11/15/2021    CHOLHDL 29.6 11/11/2020

## 2024-11-11 NOTE — PATIENT INSTRUCTIONS
Office name Phone Address   [facility]   Dr Scruggs, [facility]  [phone] [address], [place], [place]   Dr Hester [phone] [address], [place], [place]   Dr Solis, [facility]  [phone] [address] [facility], [place], [place]   [facility]-Dr Coleen Reyes [phone] [address], [place], [place]   [facility] [phone] [address], [place], [place]   [facility]  [phone] [address], [place], [place]   [facility], Dr Roldan [phone] [address], [place], [place]   [facility]  [phone] [address] [place], [place]   [facility]   Dr Hilda Starks [phone] [address], [place]   Dr Scruggs, [facility]-  [phone] [address] [place], [place]   [facility], Dr Darwin Sinclair [phone] [address], [place], [place]   [facility] [phone] [address] [place], [facility]   [facility] -  (Ophthalmology)  [phone] [address] [facility], [facility]  [phone] [address] [facility] [place], [place]   [facility]  [phone] [address] [facility], [facility]   [facility]-Dr Saroj Corbett [phone] [address], [place], [place]   [facility] [place] [facility]   [facility] [phone] [address], [place], [place]   [facility] [phone] [address], [place], [place]   [facility] [phone] [address] [place], [place]

## 2024-12-18 DIAGNOSIS — Z78.0 MENOPAUSE: ICD-10-CM

## 2025-03-19 DIAGNOSIS — G44.201 ACUTE INTRACTABLE TENSION-TYPE HEADACHE: ICD-10-CM

## 2025-03-19 RX ORDER — NAPROXEN 500 MG/1
500 TABLET ORAL 2 TIMES DAILY PRN
Qty: 30 TABLET | Refills: 1 | Status: SHIPPED | OUTPATIENT
Start: 2025-03-19

## 2025-03-19 NOTE — TELEPHONE ENCOUNTER
Refill Routing Note   Medication(s) are not appropriate for processing by [facility] for the following reason(s):        Outside of protocol    [facility] action(s):  Route               Appointments  past 12m or future 3m with PCP    Date Provider   Last Visit   11/11/2024 Amanda Galdamez MD   Next Visit   5/12/2025 Amanda Galdamez MD   ED visits in past 90 days: 0        Note composed:2:54 PM 03/19/2025

## 2025-03-19 NOTE — TELEPHONE ENCOUNTER
No care due was identified.  [facility] Embedded Care Due Messages. Reference number: [id number].   3/19/2025 2:40:44 PM CDT

## 2025-04-28 ENCOUNTER — OFFICE VISIT (OUTPATIENT)
Dept: ORTHOPEDICS | Facility: CLINIC | Age: 80
End: 2025-04-28
Payer: MEDICARE

## 2025-04-28 VITALS — BODY MASS INDEX: 27.49 KG/M2 | HEIGHT: 64 IN | WEIGHT: 161 LBS

## 2025-04-28 DIAGNOSIS — M65.4 RADIAL STYLOID TENOSYNOVITIS (DE QUERVAIN): Primary | ICD-10-CM

## 2025-04-28 PROCEDURE — 99999 PR PBB SHADOW E&M-EST. PATIENT-LVL III: CPT | Mod: PBBFAC,,, | Performed by: ORTHOPAEDIC SURGERY

## 2025-04-28 PROCEDURE — 3288F FALL RISK ASSESSMENT DOCD: CPT | Mod: CPTII,S$GLB,, | Performed by: ORTHOPAEDIC SURGERY

## 2025-04-28 PROCEDURE — 99214 OFFICE O/P EST MOD 30 MIN: CPT | Mod: 25,S$GLB,, | Performed by: ORTHOPAEDIC SURGERY

## 2025-04-28 PROCEDURE — 20550 NJX 1 TENDON SHEATH/LIGAMENT: CPT | Mod: RT,S$GLB,, | Performed by: ORTHOPAEDIC SURGERY

## 2025-04-28 PROCEDURE — 1125F AMNT PAIN NOTED PAIN PRSNT: CPT | Mod: CPTII,S$GLB,, | Performed by: ORTHOPAEDIC SURGERY

## 2025-04-28 PROCEDURE — 1101F PT FALLS ASSESS-DOCD LE1/YR: CPT | Mod: CPTII,S$GLB,, | Performed by: ORTHOPAEDIC SURGERY

## 2025-04-28 PROCEDURE — 1160F RVW MEDS BY RX/DR IN RCRD: CPT | Mod: CPTII,S$GLB,, | Performed by: ORTHOPAEDIC SURGERY

## 2025-04-28 PROCEDURE — 1159F MED LIST DOCD IN RCRD: CPT | Mod: CPTII,S$GLB,, | Performed by: ORTHOPAEDIC SURGERY

## 2025-04-28 RX ORDER — TRIAMCINOLONE ACETONIDE 40 MG/ML
40 INJECTION, SUSPENSION INTRA-ARTICULAR; INTRAMUSCULAR
Status: DISCONTINUED | OUTPATIENT
Start: 2025-04-28 | End: 2025-04-28 | Stop reason: HOSPADM

## 2025-04-28 RX ADMIN — TRIAMCINOLONE ACETONIDE 40 MG: 40 INJECTION, SUSPENSION INTRA-ARTICULAR; INTRAMUSCULAR at 11:04
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
## 2025-04-28 NOTE — PROCEDURES
Tendon Sheath    Date/Time: 4/28/2025 11:15 AM    Performed by: Nicanor Ceja MD  Authorized by: Nicanor Ceja MD    Consent Done?:  Yes (Verbal)  Indications:  Pain  Site marked: the procedure site was marked    Timeout: prior to procedure the correct patient, procedure, and site was verified    Prep: patient was prepped and draped in usual sterile fashion      Local anesthesia used?: Yes    Anesthesia:  Local infiltration  Local anesthetic:  Lidocaine 1% without epinephrine and bupivacaine 0.25% without epinephrine  Anesthetic total (ml):  2    Needle size:  22 G  Approach:  Dorsal  Medications:  40 mg triamcinolone acetonide 40 mg/mL  Patient tolerance:  Patient tolerated the procedure well with no immediate complications

## 2025-04-30 DIAGNOSIS — M65.4 RADIAL STYLOID TENOSYNOVITIS (DE QUERVAIN): Primary | ICD-10-CM

## 2025-05-05 ENCOUNTER — CLINICAL SUPPORT (OUTPATIENT)
Dept: REHABILITATION | Facility: HOSPITAL | Age: 80
End: 2025-05-05
Attending: ORTHOPAEDIC SURGERY
Payer: MEDICARE

## 2025-05-05 DIAGNOSIS — R29.898 DECREASED GRIP STRENGTH OF RIGHT HAND: ICD-10-CM

## 2025-05-05 DIAGNOSIS — R52 PAIN AGGRAVATED BY ACTIVITIES OF DAILY LIVING: ICD-10-CM

## 2025-05-05 DIAGNOSIS — M25.631 DECREASED RANGE OF MOTION OF RIGHT WRIST: Primary | ICD-10-CM

## 2025-05-05 PROCEDURE — 97110 THERAPEUTIC EXERCISES: CPT | Mod: PN

## 2025-05-05 PROCEDURE — 97166 OT EVAL MOD COMPLEX 45 MIN: CPT | Mod: PN

## 2025-05-05 NOTE — PROGRESS NOTES
Outpatient Rehab    Occupational Therapy Evaluation    Patient Name: Bernarda Perera  MRN: [id number]  YOB: 1945  Encounter Date: 5/5/2025    Therapy Diagnosis:   Encounter Diagnoses   Name Primary?    Decreased range of motion of right wrist Yes    Decreased  strength of right hand     Pain aggravated by activities of daily living      Physician: Nicanor Ceja,*    Physician Orders: Eval and Treat  Medical Diagnosis: Radial styloid tenosynovitis (de quervain)    Visit # / Visits Authorized: 1 / 1  Insurance Authorization Period: 4/30/2025 to 4/30/2026  Date of Evaluation: 5/5/2025  Plan of Care Certification: 5/5/2025 to 8/5/25     Time In: 1330   Time Out: 1425  Total Time (in minutes): 55   Total Billable Time (in minutes): 55    Intake Outcome Measure for FOTO Survey    Therapist reviewed FOTO scores for Bernarda Perera on 5/5/2025.   FOTO report - see Media section or FOTO account episode details.     Intake Score: 60%    Precautions  Strengthening Precautions: general activity restrictions         Subjective   History of Present Illness  Bernarda is a 80 y.o. female who reports to occupational therapy with a chief concern of pain of right wrist.     The patient reports a medical diagnosis of M65.4 (ICD-10-CM) - Radial styloid tenosynovitis (de quervain).    Diagnostic tests related to this condition: X-ray.   X-Ray Details: Continued healing of subacute nondisplaced transverse fracture distal radial metadiaphysis with complete osseous fusion. Continued healing of subacute fracture base of the ulnar styloid with near complete osseous fusion. Carpal bones intact.    Dominant Hand: Right  History of Present Condition/Illness: Pt was seen in OT in September-October 2024 following right distal radius and ulnar styloid fracture. Pt elected to discontinue therapy at that time. Pt's right wrist pain persisted for several months following.She wore a wrist brace intermittently, and she had greatest  pain when lifting her pets. Pt was diagnosed with dequervains tenosynovitis, and she received an injection to the first dorsal compartment of the right wrist on 4/28/25 with no relief. Patient was recommended to continue bracing using a thumb immobilizer, but she does not like wearing it often.    Activities of Daily Living  Social history was obtained from Patient.    General Prior Level of Function Comments: independent  General Current Level of Function Comments: min assist for heavy lifting or manual tasks  Patient Roles: Caregiver for pet  Patient Responsibilities: Driving, Community mobility, Health management, Home management, Laundry, Meal prep, Personal ADL, Shopping    Previously independent with activities of daily living? Yes     Currently independent with activities of daily living? Yes     Patient reported independence in all ADLs. She uses a long-handled sponge for bathing.    Previously independent with instrumental activities of daily living? Yes     Currently independent with instrumental activities of daily living? No  Activities currently needing assistance include: Home establishment and management, Meal prep, Safety and emergency maintenance, and Health management.   Patient is limited in functional  and pinch strength. Her  performs heavy household chores and assists with cooking. She noticed increased difficulty with handwriting.        Pain     Patient reports a current pain level of 5/10. Pain at best is reported as 0/10. Pain at worst is reported as 8/10.   Location: first dorsal compartment  Clinical Progression (since onset): Stable  Pain-Relieving Factors: Other (Comment), Medications - over-the-counter  Other Pain-Relieving Factors: aspirin         Treatment History  Treatments  Previously Received Treatments: Yes  Previous Treatments: Occupational therapy  Currently Receiving Treatments: No    Living Arrangements  Living Situation  Housing: Home independently  Living  Arrangements: Spouse/significant other  Support Systems: Spouse/significant other, Children, Family members        Employment  Patient does not report that: Does the patient's condition impact their ability to work?  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    Bernarda Perera  has a past surgical history that includes  section, low transverse; Belt abdominoplasty; and Breast cyst excision.    Bernarda has a current medication list which includes the following prescription(s): acetaminophen, amlodipine, aspirin, atenolol-chlorthalidone, donepezil, memantine, naproxen, omega-3 fatty acids, rosuvastatin, and triamcinolone acetonide 0.1%.    Review of patient's allergies indicates:   Allergen Reactions    Ace inhibitors Other (See Comments)     cough    Sulfa (sulfonamide antibiotics) Other (See Comments)     Other reaction(s): Unknown        Objective      Upper Extremity Sensation  General Upper Extremity Sensation  Intact: Right and Left  Right Upper Extremity Sensation  Intact: Light Touch, Sharp/Dull Discrimination, Kinesthesia, Proprioception, and Hot/Cold Discrimination  Right Upper Extremity Sensation Stocking Glove Pattern: No    Left Upper Extremity Sensation  Intact: Light Touch, Sharp/Dull Discrimination, Kinesthesia, Proprioception, and Hot/Cold Discrimination  Left Upper Extremity Sensation Stocking Glove Pattern: No              Wrist/Hand Palpation  Right Wrist/Hand Palpation  Abnormal: Compartment and Styloid/Bony Prominence  Unremarkable: Muscle, Tendon/Ligament, and Carpal/Metacarpal/Phalanx  Right Wrist Hand Compartment Palpation Observations: tender to first dorsal compartment  Right Wrist Hand Styloid/Bony Prominence Palpation Observations: history of distal radius and ulnar styloid fx                   Elbow/Forearm Range of Motion   Right Elbow/Forearm   Active (deg) Passive (deg) Pain   Flexion         Extension         Forearm Pronation 90       Forearm Supination 85         Left Elbow/Forearm   Active (deg) Passive (deg) Pain   Flexion         Extension         Forearm Pronation 90       Forearm Supination 90                Wrist Range of Motion  Right Wrist   Active (deg) Passive (deg) Pain Comment   Flexion 70         Extension 45         Radial Deviation 15         Ulnar Deviation 28   Yes       Left Wrist   Active (deg) Passive (deg) Pain Comment   Flexion 80         Extension 50         Radial Deviation 20         Ulnar Deviation 30                    Digit 1 - Thumb Active Range of Motion  Right Thumb   Flexion (deg) Extension (deg) Pain   CMC         MCP 30       IP 40         Right Thumb   Range (deg) Pain   Palmar ABduction 35     Radial ABduction 35     ADduction           Adequate AROM: Right Hand and Left Hand  Limited in opposition to small finger in bilateral hands              Forearm Strength   Right Strength Right Pain Left Strength Left  Pain   Pronation 4   5     Supination 4   5         Wrist Strength - Planes of Motion   Right Strength Right Pain Left Strength Left  Pain   Flexion 4   5     Extension 5   5     Radial Deviation 4   5     Ulnar Deviation (C8) 4   5       Right  Strength  Right Hand Dynamometer Position: 2  Elbow Position Forearm Position Trial 1 (lbs) Trial 2  (lbs) Trial 3  (lbs) Average  (lbs) Pain   Flexed Neutral 26 29 27 27.33         Left  Strength  Left Hand Dynamometer  Position: 2  Elbow Position Forearm Position Trial 1 (lbs) Trial 2 (lbs) Trial 3 (lbs) Average (lbs) Pain   Flexed Neutral 27 33 37 32.33         Right Pinch Strength   Trial 1 (lbs) Trial 2 (lbs) Trial 3 (lbs) Average (lbs) Pain   Lateral (Key Pinch) 8 8 11 9     Three Point (Three Jaw Raul) 7 6 4 5.67     Two Point (Tip to Tip) 5 6 5 5.33         Left Pinch Strength   Trial 1 (lbs) Trial 2 (lbs) Trial 3 (lbs) Average (lbs) Pain   Lateral (Key Pinch) 4 6 5 5     Three Point (Three Jaw Raul) 5 5 4 4.67     Two Point (Tip to Tip) 5 5 4 4.67                Elbow Special Tests  Elbow Nerve Entrapment Tests  Negative: Right Froment's Sign and Right Wartenberg's Sign       Wrist/Hand Special Tests  Wrist/Hand Nerve Function Tests  Negative: Right Carpal Compression and Right Froment's  Negative: Right Phalen's, Right Reverse Phalen's, and Right Wartenberg's  Wrist/Hand Tendinopathy Tests  Positive: Right Finkelstein's    Purdue Peg Test: R 11, L 9, B 9, Assembly 3           Treatment:  Therapeutic Exercise  TE 1: wrist flexion with forearm supported using 2# for 2 min  TE 2: wrist extension with forearm supported using 2# for 2 min  TE 3: forearm pronation to supination using 2# distal bar weight for 2 min  TE 4: radial to ulnar deviation using 2# for 2 min  TE 5: green rubberband thumb radial abduction, 3x10  TE 6: thumb palmar abduction, 3x10  Therapeutic Activity  TA 1: red web gross grasp squeezes    Time Entry(in minutes):  OT Evaluation (Moderate) Time Entry: 41  Therapeutic Activity Time Entry: 2  Therapeutic Exercise Time Entry: 12    Assessment & Plan   Assessment  Bernarda presents with a condition of Moderate complexity.   Presentation of Symptoms: Stable  Will Comorbidities Impact Care: Yes  Memory impairment    IADL Limitations: Care of pets, Health management and maintenance, Home establishment and management, Meal preparation and cleanup, Safety and emergency maintenance, Driving  Leisure Limitations:  Leisure participation  Functional Limitations: Activity tolerance, Carrying objects, Fine motor coordination, Gross motor coordination, Manipulating objects, Pain when reaching, Pain with ADLs/IADLs, Range of motion, Proprioception         Personal Factors Affecting Prognosis: Other (Comment) memory impairment     Evaluation/Treatment Response: Patient responded to treatment well, Patient resistant to treatment  Patient Goal for Therapy (OT): to decrease right wrist pain  Prognosis: Fair  Assessment Details: Patient presented with right radial styloid/dequervains tenosynovitis following right distal radius and ulnar styloid fracture last year. She demonstrated mild range of motion deficit due to history of conservatively treated fracture as well as decreased  and pinch strength. Patient maintained fairly good dexterity as evidenced by Purdue Peg Test, however, she noted her coordinated motions such as handwriting have become more challenging with wrist pain. Her pain was not reduced by injection given by MD, and she is somewhat receptive to engagement in OT after encouragement from her . OT reviewed home exercise program with patient and patient's  and encouraged thumb immobilizing brace wear as recommended by MD with education of importance of compliance to enhance functional outcomes.     Plan  From an occupational therapy perspective, the patient would benefit from: Skilled Rehab Services    Planned therapy interventions include: Therapeutic exercise, Therapeutic activities, Manual therapy, Neuromuscular re-education, ADLs/IADLs, and Orthotic management and training.    Planned modalities to include: Fluidotherapy, Paraffin bath, Ultrasound, and Thermotherapy (hot pack).        Visit Frequency: 1 times Per Week for 6 Weeks.  Other/tapered frequency details: Patient stated she would prefer home exercise program only but patient's  encouraged her to engage in formal therapy as recommended  by OT and MD.    This plan was discussed with Patient and Family.   Discussion participants: Agreed Upon Plan of Care             Patient's spiritual, cultural, and educational needs considered and patient agreeable to plan of care and goals.     Education  Education was done with Patient. The patient's learning style includes Demonstration, Listening, and Pictures/video. The patient Demonstrates understanding and Verbalizes understanding.                 Goals:   Active       long-term       Patient will improve status score on FOTO by 10% to demonstrate an increase in self-perceived functional performance.        Start:  05/05/25    Expected End:  06/16/25            patient will increase right hand gross grasp strength by 2# or more needed to enhance functional grasp in ADL/IADL task performance.       Start:  05/05/25    Expected End:  06/16/25            patient will report 4 point decrease in right wrist pain on average (to 1/10) during functional and therapeutic task performance.       Start:  05/05/25    Expected End:  06/16/25               short-term       Patient to be independent with home exercise program as issued by Occupational Therapist, noted through return demonstration to increase clinical carry over.        Start:  05/05/25    Expected End:  05/26/25            patient will report compliance in brace wear       Start:  05/05/25    Expected End:  05/26/25            patient will report 2 point decrease in right wrist pain on average (to 3/10) during functional and therapeutic task performance.       Start:  05/05/25    Expected End:  05/26/25                Teresa Hansen OT

## 2025-05-12 ENCOUNTER — TELEPHONE (OUTPATIENT)
Dept: ADMINISTRATIVE | Facility: OTHER | Age: 80
End: 2025-05-12
Payer: MEDICARE

## 2025-05-12 ENCOUNTER — LAB VISIT (OUTPATIENT)
Dept: LAB | Facility: HOSPITAL | Age: 80
End: 2025-05-12
Payer: MEDICARE

## 2025-05-12 ENCOUNTER — OFFICE VISIT (OUTPATIENT)
Dept: FAMILY MEDICINE | Facility: CLINIC | Age: 80
End: 2025-05-12
Payer: MEDICARE

## 2025-05-12 VITALS
HEIGHT: 64 IN | DIASTOLIC BLOOD PRESSURE: 82 MMHG | HEART RATE: 69 BPM | SYSTOLIC BLOOD PRESSURE: 136 MMHG | WEIGHT: 156.38 LBS | BODY MASS INDEX: 26.7 KG/M2 | RESPIRATION RATE: 18 BRPM | OXYGEN SATURATION: 95 %

## 2025-05-12 DIAGNOSIS — I10 ESSENTIAL HYPERTENSION: Chronic | ICD-10-CM

## 2025-05-12 DIAGNOSIS — I10 ESSENTIAL HYPERTENSION: Primary | Chronic | ICD-10-CM

## 2025-05-12 DIAGNOSIS — R41.3 MEMORY LOSS: ICD-10-CM

## 2025-05-12 DIAGNOSIS — G31.84 MILD COGNITIVE IMPAIRMENT: ICD-10-CM

## 2025-05-12 DIAGNOSIS — E11.319 CONTROLLED TYPE 2 DIABETES MELLITUS WITH RETINOPATHY WITHOUT MACULAR EDEMA, WITHOUT LONG-TERM CURRENT USE OF INSULIN, UNSPECIFIED LATERALITY, UNSPECIFIED RETINOPATHY SEVERITY: ICD-10-CM

## 2025-05-12 DIAGNOSIS — G30.8 OTHER ALZHEIMER'S DISEASE (CODE): ICD-10-CM

## 2025-05-12 LAB
ALBUMIN SERPL BCP-MCNC: 4.3 G/DL (ref 3.5–5.2)
ALP SERPL-CCNC: 46 UNIT/L (ref 40–150)
ALT SERPL W/O P-5'-P-CCNC: 15 UNIT/L (ref 10–44)
ANION GAP (OHS): 11 MMOL/L (ref 8–16)
AST SERPL-CCNC: 19 UNIT/L (ref 11–45)
BILIRUB SERPL-MCNC: 1 MG/DL (ref 0.1–1)
BUN SERPL-MCNC: 17 MG/DL (ref 8–23)
CALCIUM SERPL-MCNC: 9.5 MG/DL (ref 8.7–10.5)
CHLORIDE SERPL-SCNC: 102 MMOL/L (ref 95–110)
CO2 SERPL-SCNC: 23 MMOL/L (ref 23–29)
CREAT SERPL-MCNC: 0.8 MG/DL (ref 0.5–1.4)
EAG (OHS): 117 MG/DL (ref 68–131)
GFR SERPLBLD CREATININE-BSD FMLA CKD-EPI: >60 ML/MIN/1.73/M2
GLUCOSE SERPL-MCNC: 107 MG/DL (ref 70–110)
HBA1C MFR BLD: 5.7 % (ref 4–5.6)
POTASSIUM SERPL-SCNC: 4.4 MMOL/L (ref 3.5–5.1)
PROT SERPL-MCNC: 7.5 GM/DL (ref 6–8.4)
SODIUM SERPL-SCNC: 136 MMOL/L (ref 136–145)
TSH SERPL-ACNC: 2.16 UIU/ML (ref 0.4–4)

## 2025-05-12 PROCEDURE — 82607 VITAMIN B-12: CPT

## 2025-05-12 PROCEDURE — 84443 ASSAY THYROID STIM HORMONE: CPT

## 2025-05-12 PROCEDURE — 36415 COLL VENOUS BLD VENIPUNCTURE: CPT

## 2025-05-12 PROCEDURE — 99999 PR PBB SHADOW E&M-EST. PATIENT-LVL IV: CPT | Mod: PBBFAC,,, | Performed by: FAMILY MEDICINE

## 2025-05-12 PROCEDURE — 83036 HEMOGLOBIN GLYCOSYLATED A1C: CPT

## 2025-05-12 PROCEDURE — 80053 COMPREHEN METABOLIC PANEL: CPT
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
## 2025-05-12 NOTE — ASSESSMENT & PLAN NOTE
Aricept and Namenda regularly. Still having issues with memory. Never completed work up with Psychiatry. Would like to see a Neurologist to follow up.

## 2025-05-13 LAB — VIT B12 SERPL-MCNC: 414 PG/ML (ref 210–950)

## 2025-05-16 ENCOUNTER — RESULTS FOLLOW-UP (OUTPATIENT)
Dept: FAMILY MEDICINE | Facility: CLINIC | Age: 80
End: 2025-05-16

## 2025-08-10 ENCOUNTER — OFFICE VISIT (OUTPATIENT)
Dept: URGENT CARE | Facility: CLINIC | Age: 80
End: 2025-08-10
Payer: MEDICARE

## 2025-08-10 VITALS
HEIGHT: 64 IN | OXYGEN SATURATION: 98 % | HEART RATE: 73 BPM | RESPIRATION RATE: 20 BRPM | DIASTOLIC BLOOD PRESSURE: 65 MMHG | TEMPERATURE: 98 F | BODY MASS INDEX: 26.52 KG/M2 | WEIGHT: 155.31 LBS | SYSTOLIC BLOOD PRESSURE: 187 MMHG

## 2025-08-10 DIAGNOSIS — H10.32 ACUTE BACTERIAL CONJUNCTIVITIS OF LEFT EYE: Primary | ICD-10-CM

## 2025-08-10 PROCEDURE — 99213 OFFICE O/P EST LOW 20 MIN: CPT | Mod: S$GLB,,, | Performed by: NURSE PRACTITIONER

## 2025-08-10 RX ORDER — GENTAMICIN SULFATE 3 MG/ML
2 SOLUTION/ DROPS OPHTHALMIC EVERY 6 HOURS
Qty: 5 ML | Refills: 0 | Status: SHIPPED | OUTPATIENT
Start: 2025-08-10 | End: 2025-08-17

## 2025-08-16 ENCOUNTER — OFFICE VISIT (OUTPATIENT)
Dept: URGENT CARE | Facility: CLINIC | Age: 80
End: 2025-08-16
Payer: MEDICARE

## 2025-08-16 ENCOUNTER — TELEPHONE (OUTPATIENT)
Dept: URGENT CARE | Facility: CLINIC | Age: 80
End: 2025-08-16
Payer: MEDICARE

## 2025-08-16 ENCOUNTER — HOSPITAL ENCOUNTER (EMERGENCY)
Facility: HOSPITAL | Age: 80
Discharge: HOME | End: 2025-08-16
Attending: EMERGENCY MEDICINE
Payer: MEDICARE

## 2025-08-16 VITALS
DIASTOLIC BLOOD PRESSURE: 77 MMHG | SYSTOLIC BLOOD PRESSURE: 159 MMHG | WEIGHT: 157.19 LBS | BODY MASS INDEX: 26.19 KG/M2 | HEART RATE: 88 BPM | OXYGEN SATURATION: 97 % | TEMPERATURE: 98 F | HEIGHT: 65 IN | RESPIRATION RATE: 17 BRPM

## 2025-08-16 VITALS
HEART RATE: 79 BPM | HEIGHT: 65 IN | TEMPERATURE: 98 F | WEIGHT: 160 LBS | DIASTOLIC BLOOD PRESSURE: 98 MMHG | OXYGEN SATURATION: 98 % | BODY MASS INDEX: 26.66 KG/M2 | SYSTOLIC BLOOD PRESSURE: 204 MMHG | RESPIRATION RATE: 19 BRPM

## 2025-08-16 DIAGNOSIS — R53.1 GENERALIZED WEAKNESS: ICD-10-CM

## 2025-08-16 DIAGNOSIS — R06.02 SOB (SHORTNESS OF BREATH): ICD-10-CM

## 2025-08-16 DIAGNOSIS — R42 DIZZINESS: Primary | ICD-10-CM

## 2025-08-16 DIAGNOSIS — I10 ESSENTIAL HYPERTENSION: ICD-10-CM

## 2025-08-16 DIAGNOSIS — E78.5 HYPERLIPIDEMIA WITH TARGET LDL LESS THAN 100: Chronic | ICD-10-CM

## 2025-08-16 DIAGNOSIS — I10 HYPERTENSION, UNSPECIFIED TYPE: ICD-10-CM

## 2025-08-16 DIAGNOSIS — I16.0 HYPERTENSIVE URGENCY: Primary | ICD-10-CM

## 2025-08-16 LAB
ABSOLUTE EOSINOPHIL (OHS): 0.02 K/UL
ABSOLUTE MONOCYTE (OHS): 0.65 K/UL (ref 0.3–1)
ABSOLUTE NEUTROPHIL COUNT (OHS): 9.27 K/UL (ref 1.8–7.7)
ALBUMIN SERPL BCP-MCNC: 4.2 G/DL (ref 3.5–5.2)
ALP SERPL-CCNC: 57 UNIT/L (ref 40–150)
ALT SERPL W/O P-5'-P-CCNC: 15 UNIT/L (ref 10–44)
ANION GAP (OHS): 12 MMOL/L (ref 8–16)
AST SERPL-CCNC: 28 UNIT/L (ref 11–45)
BASOPHILS # BLD AUTO: 0.03 K/UL
BASOPHILS NFR BLD AUTO: 0.3 %
BILIRUB SERPL-MCNC: 0.4 MG/DL (ref 0.1–1)
BUN SERPL-MCNC: 9 MG/DL (ref 8–23)
CALCIUM SERPL-MCNC: 9.5 MG/DL (ref 8.7–10.5)
CHLORIDE SERPL-SCNC: 101 MMOL/L (ref 95–110)
CO2 SERPL-SCNC: 24 MMOL/L (ref 23–29)
CREAT SERPL-MCNC: 0.7 MG/DL (ref 0.5–1.4)
CTP QC/QA: YES
CTP QC/QA: YES
ERYTHROCYTE [DISTWIDTH] IN BLOOD BY AUTOMATED COUNT: 11.9 % (ref 11.5–14.5)
GFR SERPLBLD CREATININE-BSD FMLA CKD-EPI: >60 ML/MIN/1.73/M2
GLUCOSE SERPL-MCNC: 120 MG/DL (ref 70–110)
GLUCOSE SERPL-MCNC: 178 MG/DL (ref 70–110)
HCT VFR BLD AUTO: 40.9 % (ref 37–48.5)
HGB BLD-MCNC: 14.2 GM/DL (ref 12–16)
IMM GRANULOCYTES # BLD AUTO: 0.03 K/UL (ref 0–0.04)
IMM GRANULOCYTES NFR BLD AUTO: 0.3 % (ref 0–0.5)
LYMPHOCYTES # BLD AUTO: 1.31 K/UL (ref 1–4.8)
MCH RBC QN AUTO: 30.5 PG (ref 27–31)
MCHC RBC AUTO-ENTMCNC: 34.7 G/DL (ref 32–36)
MCV RBC AUTO: 88 FL (ref 82–98)
NT-PROBNP SERPL-MCNC: 142 PG/ML
NUCLEATED RBC (/100WBC) (OHS): 0 /100 WBC
PLATELET # BLD AUTO: 287 K/UL (ref 150–450)
PMV BLD AUTO: 8.1 FL (ref 9.2–12.9)
POC MOLECULAR INFLUENZA A AGN: NEGATIVE
POC MOLECULAR INFLUENZA B AGN: NEGATIVE
POTASSIUM SERPL-SCNC: 4.1 MMOL/L (ref 3.5–5.1)
PROT SERPL-MCNC: 7.8 GM/DL (ref 6–8.4)
RBC # BLD AUTO: 4.65 M/UL (ref 4–5.4)
RELATIVE EOSINOPHIL (OHS): 0.2 %
RELATIVE LYMPHOCYTE (OHS): 11.6 % (ref 18–48)
RELATIVE MONOCYTE (OHS): 5.7 % (ref 4–15)
RELATIVE NEUTROPHIL (OHS): 81.9 % (ref 38–73)
SARS-COV+SARS-COV-2 AG RESP QL IA.RAPID: NEGATIVE
SODIUM SERPL-SCNC: 137 MMOL/L (ref 136–145)
TROPONIN I SERPL HS-MCNC: <3 NG/L
WBC # BLD AUTO: 11.31 K/UL (ref 3.9–12.7)

## 2025-08-16 PROCEDURE — 83880 ASSAY OF NATRIURETIC PEPTIDE: CPT | Performed by: EMERGENCY MEDICINE

## 2025-08-16 PROCEDURE — 25000003 PHARM REV CODE 250: Performed by: EMERGENCY MEDICINE

## 2025-08-16 PROCEDURE — 99285 EMERGENCY DEPT VISIT HI MDM: CPT | Mod: 25

## 2025-08-16 PROCEDURE — 93005 ELECTROCARDIOGRAM TRACING: CPT | Performed by: INTERNAL MEDICINE

## 2025-08-16 PROCEDURE — 99215 OFFICE O/P EST HI 40 MIN: CPT | Mod: S$GLB,,,

## 2025-08-16 PROCEDURE — 94760 N-INVAS EAR/PLS OXIMETRY 1: CPT

## 2025-08-16 PROCEDURE — 80053 COMPREHEN METABOLIC PANEL: CPT | Performed by: EMERGENCY MEDICINE

## 2025-08-16 PROCEDURE — 71045 X-RAY EXAM CHEST 1 VIEW: CPT | Mod: 26,,, | Performed by: RADIOLOGY

## 2025-08-16 PROCEDURE — 82962 GLUCOSE BLOOD TEST: CPT | Mod: ,,,

## 2025-08-16 PROCEDURE — 84484 ASSAY OF TROPONIN QUANT: CPT | Performed by: EMERGENCY MEDICINE

## 2025-08-16 PROCEDURE — 87811 SARS-COV-2 COVID19 W/OPTIC: CPT | Mod: QW,S$GLB,,

## 2025-08-16 PROCEDURE — 87502 INFLUENZA DNA AMP PROBE: CPT | Mod: QW,,,

## 2025-08-16 PROCEDURE — 93010 ELECTROCARDIOGRAM REPORT: CPT | Mod: ,,, | Performed by: INTERNAL MEDICINE

## 2025-08-16 PROCEDURE — 85025 COMPLETE CBC W/AUTO DIFF WBC: CPT | Performed by: EMERGENCY MEDICINE

## 2025-08-16 PROCEDURE — 71045 X-RAY EXAM CHEST 1 VIEW: CPT | Mod: TC

## 2025-08-16 RX ORDER — ROSUVASTATIN CALCIUM 10 MG/1
10 TABLET, COATED ORAL DAILY
Qty: 30 TABLET | Refills: 0 | Status: SHIPPED | OUTPATIENT
Start: 2025-08-16 | End: 2025-09-15

## 2025-08-16 RX ORDER — ATENOLOL AND CHLORTHALIDONE TABLET 50; 25 MG/1; MG/1
1 TABLET ORAL DAILY
Qty: 30 TABLET | Refills: 0 | Status: SHIPPED | OUTPATIENT
Start: 2025-08-16 | End: 2025-09-15

## 2025-08-16 RX ORDER — NITROGLYCERIN 20 MG/G
0.5 OINTMENT TOPICAL
Status: COMPLETED | OUTPATIENT
Start: 2025-08-16 | End: 2025-08-16

## 2025-08-16 RX ADMIN — NITROGLYCERIN 0.5 INCH: 20 OINTMENT TOPICAL at 05:08

## 2025-08-18 ENCOUNTER — TELEPHONE (OUTPATIENT)
Dept: FAMILY MEDICINE | Facility: CLINIC | Age: 80
End: 2025-08-18
Payer: MEDICARE

## 2025-08-18 DIAGNOSIS — R41.3 MEMORY LOSS: ICD-10-CM

## 2025-08-18 LAB
OHS QRS DURATION: 76 MS
OHS QRS DURATION: 82 MS
OHS QTC CALCULATION: 413 MS
OHS QTC CALCULATION: 430 MS

## 2025-08-18 RX ORDER — DONEPEZIL HYDROCHLORIDE 10 MG/1
10 TABLET, FILM COATED ORAL NIGHTLY
Qty: 90 TABLET | Refills: 3 | Status: SHIPPED | OUTPATIENT
Start: 2025-08-18

## 2025-08-25 ENCOUNTER — OFFICE VISIT (OUTPATIENT)
Dept: FAMILY MEDICINE | Facility: CLINIC | Age: 80
End: 2025-08-25
Payer: MEDICARE

## 2025-08-25 VITALS
BODY MASS INDEX: 26.22 KG/M2 | SYSTOLIC BLOOD PRESSURE: 138 MMHG | DIASTOLIC BLOOD PRESSURE: 64 MMHG | OXYGEN SATURATION: 97 % | WEIGHT: 157.38 LBS | HEIGHT: 65 IN | RESPIRATION RATE: 14 BRPM | HEART RATE: 60 BPM

## 2025-08-25 DIAGNOSIS — L24.9 IRRITANT CONTACT DERMATITIS OF NECK: ICD-10-CM

## 2025-08-25 DIAGNOSIS — E11.319 CONTROLLED TYPE 2 DIABETES MELLITUS WITH RETINOPATHY WITHOUT MACULAR EDEMA, WITHOUT LONG-TERM CURRENT USE OF INSULIN, UNSPECIFIED LATERALITY, UNSPECIFIED RETINOPATHY SEVERITY: ICD-10-CM

## 2025-08-25 DIAGNOSIS — I10 ESSENTIAL HYPERTENSION: Primary | ICD-10-CM

## 2025-08-25 DIAGNOSIS — G31.84 MILD COGNITIVE IMPAIRMENT: ICD-10-CM

## 2025-08-25 PROCEDURE — 1159F MED LIST DOCD IN RCRD: CPT | Mod: CPTII,S$GLB,, | Performed by: NURSE PRACTITIONER

## 2025-08-25 PROCEDURE — 3288F FALL RISK ASSESSMENT DOCD: CPT | Mod: CPTII,S$GLB,, | Performed by: NURSE PRACTITIONER

## 2025-08-25 PROCEDURE — 3078F DIAST BP <80 MM HG: CPT | Mod: CPTII,S$GLB,, | Performed by: NURSE PRACTITIONER

## 2025-08-25 PROCEDURE — 1126F AMNT PAIN NOTED NONE PRSNT: CPT | Mod: CPTII,S$GLB,, | Performed by: NURSE PRACTITIONER

## 2025-08-25 PROCEDURE — 1101F PT FALLS ASSESS-DOCD LE1/YR: CPT | Mod: CPTII,S$GLB,, | Performed by: NURSE PRACTITIONER

## 2025-08-25 PROCEDURE — 99214 OFFICE O/P EST MOD 30 MIN: CPT | Mod: S$GLB,,, | Performed by: NURSE PRACTITIONER

## 2025-08-25 PROCEDURE — 3075F SYST BP GE 130 - 139MM HG: CPT | Mod: CPTII,S$GLB,, | Performed by: NURSE PRACTITIONER

## 2025-08-25 PROCEDURE — 1160F RVW MEDS BY RX/DR IN RCRD: CPT | Mod: CPTII,S$GLB,, | Performed by: NURSE PRACTITIONER

## 2025-08-25 PROCEDURE — 99999 PR PBB SHADOW E&M-EST. PATIENT-LVL IV: CPT | Mod: PBBFAC,,, | Performed by: NURSE PRACTITIONER

## 2025-08-25 RX ORDER — AMLODIPINE BESYLATE 5 MG/1
5 TABLET ORAL DAILY
Qty: 7 TABLET | Refills: 0 | Status: SHIPPED | OUTPATIENT
Start: 2025-08-25

## 2025-08-25 RX ORDER — TRIAMCINOLONE ACETONIDE 1 MG/G
CREAM TOPICAL 2 TIMES DAILY
Qty: 45 G | Refills: 1 | Status: SHIPPED | OUTPATIENT
Start: 2025-08-25

## 2025-08-25 RX ORDER — BLOOD-GLUCOSE METER
1 KIT MISCELLANEOUS DAILY
Qty: 1 EACH | Refills: 0 | Status: SHIPPED | OUTPATIENT
Start: 2025-08-25

## 2025-08-25 RX ORDER — ATENOLOL AND CHLORTHALIDONE TABLET 50; 25 MG/1; MG/1
1 TABLET ORAL DAILY
Qty: 90 TABLET | Refills: 3 | Status: SHIPPED | OUTPATIENT
Start: 2025-08-25

## 2025-08-25 RX ORDER — AMLODIPINE BESYLATE 5 MG/1
5 TABLET ORAL DAILY
Qty: 90 TABLET | Refills: 3 | Status: SHIPPED | OUTPATIENT
Start: 2025-08-25

## 2025-09-03 ENCOUNTER — TELEPHONE (OUTPATIENT)
Dept: FAMILY MEDICINE | Facility: CLINIC | Age: 80
End: 2025-09-03
Payer: MEDICARE

## 2025-09-03 DIAGNOSIS — I10 ESSENTIAL HYPERTENSION: Primary | ICD-10-CM

## 2025-09-03 DIAGNOSIS — I10 ESSENTIAL HYPERTENSION: ICD-10-CM

## 2025-09-03 RX ORDER — AMLODIPINE BESYLATE 5 MG/1
5 TABLET ORAL DAILY
Qty: 90 TABLET | Refills: 0 | Status: SHIPPED | OUTPATIENT
Start: 2025-09-03 | End: 2025-09-04 | Stop reason: SDUPTHER

## 2025-09-03 RX ORDER — AMLODIPINE BESYLATE 5 MG/1
5 TABLET ORAL DAILY
Qty: 90 TABLET | Refills: 0 | Status: SHIPPED | OUTPATIENT
Start: 2025-09-03 | End: 2025-09-03

## 2025-09-03 RX ORDER — AMLODIPINE BESYLATE 5 MG/1
5 TABLET ORAL DAILY
Qty: 7 TABLET | Refills: 0 | OUTPATIENT
Start: 2025-09-03

## 2025-09-04 ENCOUNTER — OFFICE VISIT (OUTPATIENT)
Dept: FAMILY MEDICINE | Facility: CLINIC | Age: 80
End: 2025-09-04
Payer: MEDICARE

## 2025-09-04 VITALS
HEIGHT: 65 IN | BODY MASS INDEX: 25.97 KG/M2 | DIASTOLIC BLOOD PRESSURE: 66 MMHG | HEART RATE: 65 BPM | WEIGHT: 155.88 LBS | RESPIRATION RATE: 16 BRPM | OXYGEN SATURATION: 98 % | SYSTOLIC BLOOD PRESSURE: 134 MMHG

## 2025-09-04 DIAGNOSIS — I10 ESSENTIAL HYPERTENSION: ICD-10-CM

## 2025-09-04 DIAGNOSIS — E11.319 CONTROLLED TYPE 2 DIABETES MELLITUS WITH RETINOPATHY WITHOUT MACULAR EDEMA, WITHOUT LONG-TERM CURRENT USE OF INSULIN, UNSPECIFIED LATERALITY, UNSPECIFIED RETINOPATHY SEVERITY: Primary | Chronic | ICD-10-CM

## 2025-09-04 PROCEDURE — 1159F MED LIST DOCD IN RCRD: CPT | Mod: CPTII,S$GLB,, | Performed by: FAMILY MEDICINE

## 2025-09-04 PROCEDURE — 1126F AMNT PAIN NOTED NONE PRSNT: CPT | Mod: CPTII,S$GLB,, | Performed by: FAMILY MEDICINE

## 2025-09-04 PROCEDURE — 3078F DIAST BP <80 MM HG: CPT | Mod: CPTII,S$GLB,, | Performed by: FAMILY MEDICINE

## 2025-09-04 PROCEDURE — 3288F FALL RISK ASSESSMENT DOCD: CPT | Mod: CPTII,S$GLB,, | Performed by: FAMILY MEDICINE

## 2025-09-04 PROCEDURE — 3075F SYST BP GE 130 - 139MM HG: CPT | Mod: CPTII,S$GLB,, | Performed by: FAMILY MEDICINE

## 2025-09-04 PROCEDURE — 99999 PR PBB SHADOW E&M-EST. PATIENT-LVL IV: CPT | Mod: PBBFAC,,, | Performed by: FAMILY MEDICINE

## 2025-09-04 PROCEDURE — 1160F RVW MEDS BY RX/DR IN RCRD: CPT | Mod: CPTII,S$GLB,, | Performed by: FAMILY MEDICINE

## 2025-09-04 PROCEDURE — 1101F PT FALLS ASSESS-DOCD LE1/YR: CPT | Mod: CPTII,S$GLB,, | Performed by: FAMILY MEDICINE

## 2025-09-04 PROCEDURE — 99214 OFFICE O/P EST MOD 30 MIN: CPT | Mod: S$GLB,,, | Performed by: FAMILY MEDICINE

## 2025-09-04 RX ORDER — NEBULIZER AND COMPRESSOR
EACH MISCELLANEOUS
Qty: 1 EACH | Refills: 0 | COMMUNITY
Start: 2025-09-04

## 2025-09-04 RX ORDER — AMLODIPINE BESYLATE 5 MG/1
5 TABLET ORAL DAILY
Qty: 90 TABLET | Refills: 3 | Status: SHIPPED | OUTPATIENT
Start: 2025-09-04